# Patient Record
Sex: FEMALE | Race: WHITE | NOT HISPANIC OR LATINO | Employment: OTHER | URBAN - METROPOLITAN AREA
[De-identification: names, ages, dates, MRNs, and addresses within clinical notes are randomized per-mention and may not be internally consistent; named-entity substitution may affect disease eponyms.]

---

## 2023-03-09 LAB
CHOLESTEROL (MG/DL) IN SER/PLAS: 180 MG/DL (ref 0–199)
CHOLESTEROL IN HDL (MG/DL) IN SER/PLAS: 79.7 MG/DL
CHOLESTEROL/HDL RATIO: 2.3
LDL: 84 MG/DL (ref 0–99)
TRIGLYCERIDE (MG/DL) IN SER/PLAS: 83 MG/DL (ref 0–149)
VLDL: 17 MG/DL (ref 0–40)

## 2023-05-04 LAB
ALANINE AMINOTRANSFERASE (SGPT) (U/L) IN SER/PLAS: 13 U/L (ref 7–45)
ALBUMIN (G/DL) IN SER/PLAS: 4.3 G/DL (ref 3.4–5)
ALKALINE PHOSPHATASE (U/L) IN SER/PLAS: 60 U/L (ref 33–136)
ANION GAP IN SER/PLAS: 11 MMOL/L (ref 10–20)
ASPARTATE AMINOTRANSFERASE (SGOT) (U/L) IN SER/PLAS: 16 U/L (ref 9–39)
BASOPHILS (10*3/UL) IN BLOOD BY AUTOMATED COUNT: 0.04 X10E9/L (ref 0–0.1)
BASOPHILS/100 LEUKOCYTES IN BLOOD BY AUTOMATED COUNT: 0.5 % (ref 0–2)
BILIRUBIN TOTAL (MG/DL) IN SER/PLAS: 0.6 MG/DL (ref 0–1.2)
CALCIUM (MG/DL) IN SER/PLAS: 9.8 MG/DL (ref 8.6–10.3)
CARBON DIOXIDE, TOTAL (MMOL/L) IN SER/PLAS: 29 MMOL/L (ref 21–32)
CHLORIDE (MMOL/L) IN SER/PLAS: 102 MMOL/L (ref 98–107)
CHOLESTEROL (MG/DL) IN SER/PLAS: 196 MG/DL (ref 0–199)
CHOLESTEROL IN HDL (MG/DL) IN SER/PLAS: 91.4 MG/DL
CHOLESTEROL/HDL RATIO: 2.1
CREATININE (MG/DL) IN SER/PLAS: 1.04 MG/DL (ref 0.5–1.05)
EOSINOPHILS (10*3/UL) IN BLOOD BY AUTOMATED COUNT: 0.1 X10E9/L (ref 0–0.4)
EOSINOPHILS/100 LEUKOCYTES IN BLOOD BY AUTOMATED COUNT: 1.3 % (ref 0–6)
ERYTHROCYTE DISTRIBUTION WIDTH (RATIO) BY AUTOMATED COUNT: 14.4 % (ref 11.5–14.5)
ERYTHROCYTE MEAN CORPUSCULAR HEMOGLOBIN CONCENTRATION (G/DL) BY AUTOMATED: 31.1 G/DL (ref 32–36)
ERYTHROCYTE MEAN CORPUSCULAR VOLUME (FL) BY AUTOMATED COUNT: 99 FL (ref 80–100)
ERYTHROCYTES (10*6/UL) IN BLOOD BY AUTOMATED COUNT: 3.83 X10E12/L (ref 4–5.2)
GFR FEMALE: 54 ML/MIN/1.73M2
GLUCOSE (MG/DL) IN SER/PLAS: 87 MG/DL (ref 74–99)
HEMATOCRIT (%) IN BLOOD BY AUTOMATED COUNT: 37.9 % (ref 36–46)
HEMOGLOBIN (G/DL) IN BLOOD: 11.8 G/DL (ref 12–16)
IMMATURE GRANULOCYTES/100 LEUKOCYTES IN BLOOD BY AUTOMATED COUNT: 0.4 % (ref 0–0.9)
LDL: 93 MG/DL (ref 0–99)
LEUKOCYTES (10*3/UL) IN BLOOD BY AUTOMATED COUNT: 7.6 X10E9/L (ref 4.4–11.3)
LYMPHOCYTES (10*3/UL) IN BLOOD BY AUTOMATED COUNT: 1.91 X10E9/L (ref 0.8–3)
LYMPHOCYTES/100 LEUKOCYTES IN BLOOD BY AUTOMATED COUNT: 25.3 % (ref 13–44)
MONOCYTES (10*3/UL) IN BLOOD BY AUTOMATED COUNT: 0.73 X10E9/L (ref 0.05–0.8)
MONOCYTES/100 LEUKOCYTES IN BLOOD BY AUTOMATED COUNT: 9.7 % (ref 2–10)
NEUTROPHILS (10*3/UL) IN BLOOD BY AUTOMATED COUNT: 4.75 X10E9/L (ref 1.6–5.5)
NEUTROPHILS/100 LEUKOCYTES IN BLOOD BY AUTOMATED COUNT: 62.8 % (ref 40–80)
NRBC (PER 100 WBCS) BY AUTOMATED COUNT: 0 /100 WBC (ref 0–0)
PLATELETS (10*3/UL) IN BLOOD AUTOMATED COUNT: 309 X10E9/L (ref 150–450)
POTASSIUM (MMOL/L) IN SER/PLAS: 4.2 MMOL/L (ref 3.5–5.3)
PROTEIN TOTAL: 7.2 G/DL (ref 6.4–8.2)
SODIUM (MMOL/L) IN SER/PLAS: 138 MMOL/L (ref 136–145)
THYROTROPIN (MIU/L) IN SER/PLAS BY DETECTION LIMIT <= 0.05 MIU/L: 0.05 MIU/L (ref 0.44–3.98)
THYROXINE (T4) FREE (NG/DL) IN SER/PLAS: 1.04 NG/DL (ref 0.61–1.12)
TRIGLYCERIDE (MG/DL) IN SER/PLAS: 60 MG/DL (ref 0–149)
UREA NITROGEN (MG/DL) IN SER/PLAS: 34 MG/DL (ref 6–23)
VLDL: 12 MG/DL (ref 0–40)

## 2023-09-29 ENCOUNTER — HOSPITAL ENCOUNTER (EMERGENCY)
Dept: DATA CONVERSION | Facility: HOSPITAL | Age: 83
Discharge: HOME | End: 2023-09-30
Payer: MEDICARE

## 2023-09-29 PROCEDURE — 99283 EMERGENCY DEPT VISIT LOW MDM: CPT

## 2023-09-29 PROCEDURE — 9990 CHARGE CONVERSION

## 2023-10-03 PROCEDURE — 9990 CHARGE CONVERSION

## 2023-12-05 DIAGNOSIS — I10 PRIMARY HYPERTENSION: Primary | ICD-10-CM

## 2023-12-05 RX ORDER — HYDROCHLOROTHIAZIDE 12.5 MG/1
12.5 TABLET ORAL DAILY
Qty: 90 TABLET | Refills: 3 | Status: SHIPPED | OUTPATIENT
Start: 2023-12-05

## 2023-12-05 RX ORDER — HYDROCHLOROTHIAZIDE 12.5 MG/1
12.5 TABLET ORAL DAILY
COMMUNITY
Start: 2021-02-01 | End: 2023-12-05 | Stop reason: SDUPTHER

## 2024-01-09 DIAGNOSIS — E78.5 HYPERLIPIDEMIA, UNSPECIFIED HYPERLIPIDEMIA TYPE: ICD-10-CM

## 2024-01-09 RX ORDER — ROSUVASTATIN CALCIUM 40 MG/1
40 TABLET, COATED ORAL DAILY
Qty: 90 TABLET | Refills: 3 | Status: SHIPPED | OUTPATIENT
Start: 2024-01-09 | End: 2024-03-01 | Stop reason: SDUPTHER

## 2024-01-10 ENCOUNTER — ANCILLARY PROCEDURE (OUTPATIENT)
Dept: RADIOLOGY | Facility: CLINIC | Age: 84
End: 2024-01-10
Payer: MEDICARE

## 2024-01-10 DIAGNOSIS — R06.02 SHORTNESS OF BREATH: ICD-10-CM

## 2024-01-10 PROCEDURE — 71046 X-RAY EXAM CHEST 2 VIEWS: CPT

## 2024-01-10 PROCEDURE — 71046 X-RAY EXAM CHEST 2 VIEWS: CPT | Performed by: STUDENT IN AN ORGANIZED HEALTH CARE EDUCATION/TRAINING PROGRAM

## 2024-01-12 PROBLEM — G47.00 INSOMNIA: Status: ACTIVE | Noted: 2024-01-12

## 2024-01-12 PROBLEM — I10 HYPERTENSION: Status: ACTIVE | Noted: 2024-01-12

## 2024-01-12 PROBLEM — K44.9 HIATAL HERNIA: Status: ACTIVE | Noted: 2024-01-12

## 2024-01-12 PROBLEM — R06.09 EXERTIONAL DYSPNEA: Status: ACTIVE | Noted: 2024-01-12

## 2024-01-12 PROBLEM — D36.9 TUBULAR ADENOMA: Status: ACTIVE | Noted: 2024-01-12

## 2024-01-12 PROBLEM — I25.10 CAD (CORONARY ARTERY DISEASE): Status: ACTIVE | Noted: 2024-01-12

## 2024-01-12 PROBLEM — J30.2 SEASONAL ALLERGIES: Status: ACTIVE | Noted: 2024-01-12

## 2024-01-12 PROBLEM — E78.5 HYPERLIPIDEMIA: Status: ACTIVE | Noted: 2024-01-12

## 2024-01-12 PROBLEM — D50.9 IRON DEFICIENCY ANEMIA: Status: ACTIVE | Noted: 2024-01-12

## 2024-01-12 PROBLEM — G62.9 PERIPHERAL NEUROPATHY: Status: ACTIVE | Noted: 2024-01-12

## 2024-01-16 ENCOUNTER — LAB (OUTPATIENT)
Dept: LAB | Facility: LAB | Age: 84
End: 2024-01-16
Payer: MEDICARE

## 2024-01-16 ENCOUNTER — OFFICE VISIT (OUTPATIENT)
Dept: CARDIOLOGY | Facility: CLINIC | Age: 84
End: 2024-01-16
Payer: MEDICARE

## 2024-01-16 VITALS
DIASTOLIC BLOOD PRESSURE: 80 MMHG | SYSTOLIC BLOOD PRESSURE: 136 MMHG | HEART RATE: 79 BPM | WEIGHT: 213 LBS | HEIGHT: 64 IN | OXYGEN SATURATION: 97 % | BODY MASS INDEX: 36.37 KG/M2

## 2024-01-16 DIAGNOSIS — R06.09 EXERTIONAL DYSPNEA: ICD-10-CM

## 2024-01-16 DIAGNOSIS — I15.9 SECONDARY HYPERTENSION: Primary | ICD-10-CM

## 2024-01-16 DIAGNOSIS — I15.9 SECONDARY HYPERTENSION: ICD-10-CM

## 2024-01-16 DIAGNOSIS — I25.10 CORONARY ARTERY DISEASE, UNSPECIFIED VESSEL OR LESION TYPE, UNSPECIFIED WHETHER ANGINA PRESENT, UNSPECIFIED WHETHER NATIVE OR TRANSPLANTED HEART: ICD-10-CM

## 2024-01-16 LAB — BNP SERPL-MCNC: 152 PG/ML (ref 0–99)

## 2024-01-16 PROCEDURE — 3075F SYST BP GE 130 - 139MM HG: CPT | Performed by: INTERNAL MEDICINE

## 2024-01-16 PROCEDURE — 3079F DIAST BP 80-89 MM HG: CPT | Performed by: INTERNAL MEDICINE

## 2024-01-16 PROCEDURE — 1126F AMNT PAIN NOTED NONE PRSNT: CPT | Performed by: INTERNAL MEDICINE

## 2024-01-16 PROCEDURE — 36415 COLL VENOUS BLD VENIPUNCTURE: CPT

## 2024-01-16 PROCEDURE — 1159F MED LIST DOCD IN RCRD: CPT | Performed by: INTERNAL MEDICINE

## 2024-01-16 PROCEDURE — 83880 ASSAY OF NATRIURETIC PEPTIDE: CPT

## 2024-01-16 PROCEDURE — 99214 OFFICE O/P EST MOD 30 MIN: CPT | Performed by: INTERNAL MEDICINE

## 2024-01-16 PROCEDURE — 1036F TOBACCO NON-USER: CPT | Performed by: INTERNAL MEDICINE

## 2024-01-16 RX ORDER — LOSARTAN POTASSIUM 100 MG/1
TABLET ORAL
COMMUNITY
Start: 2015-12-07 | End: 2024-02-29

## 2024-01-16 RX ORDER — OMEPRAZOLE 20 MG/1
CAPSULE, DELAYED RELEASE ORAL
COMMUNITY
Start: 2023-12-13

## 2024-01-16 RX ORDER — MONTELUKAST SODIUM 10 MG/1
TABLET ORAL
COMMUNITY
Start: 2023-10-14

## 2024-01-16 RX ORDER — LEVOTHYROXINE SODIUM 125 UG/1
TABLET ORAL
COMMUNITY
Start: 2017-04-10

## 2024-01-16 RX ORDER — ASPIRIN 81 MG/1
1 TABLET ORAL DAILY
COMMUNITY

## 2024-01-16 RX ORDER — QUETIAPINE FUMARATE 25 MG/1
TABLET, FILM COATED ORAL
COMMUNITY
Start: 2022-01-19

## 2024-01-16 RX ORDER — NITROGLYCERIN 0.4 MG/1
TABLET SUBLINGUAL
COMMUNITY
Start: 2022-04-27

## 2024-01-16 RX ORDER — ESCITALOPRAM OXALATE 10 MG/1
1 TABLET ORAL DAILY
COMMUNITY

## 2024-01-16 RX ORDER — SOLIFENACIN SUCCINATE 5 MG/1
5 TABLET, FILM COATED ORAL DAILY
COMMUNITY
Start: 2023-07-12

## 2024-01-16 RX ORDER — AZITHROMYCIN 250 MG/1
TABLET, FILM COATED ORAL
COMMUNITY
Start: 2023-12-29

## 2024-01-16 RX ORDER — BUDESONIDE AND FORMOTEROL FUMARATE DIHYDRATE 160; 4.5 UG/1; UG/1
AEROSOL RESPIRATORY (INHALATION)
COMMUNITY
Start: 2021-08-17

## 2024-01-16 RX ORDER — ALENDRONATE SODIUM 35 MG/1
TABLET ORAL
COMMUNITY
Start: 2021-02-03

## 2024-01-16 RX ORDER — FERROUS SULFATE 325(65) MG
1 TABLET ORAL DAILY
COMMUNITY
Start: 2017-09-26

## 2024-01-16 RX ORDER — ALBUTEROL SULFATE 90 UG/1
2 AEROSOL, METERED RESPIRATORY (INHALATION) EVERY 4 HOURS PRN
COMMUNITY
Start: 2017-02-24

## 2024-01-16 ASSESSMENT — ENCOUNTER SYMPTOMS: DYSPNEA ON EXERTION: 1

## 2024-01-16 NOTE — PATIENT INSTRUCTIONS
Stop Omega 3 fish oil    For your runny nose try Flonase Nasal Spray (over the counter).  Other options to try if this does not work includes Allegra or Zyrtec.    We need to do one blood test to determine if your shortness of breath is due to your heart or due to your lungs.  Call us one day after the test for the results.

## 2024-01-16 NOTE — PROGRESS NOTES
"Subjective   Lennie Castro is a 83 y.o. female.    Chief Complaint:  Shortness of breath    HPI    Since her last visit her complaints are that of shortness of breath and dyspnea with exertion.  She feels that this is much worse in comparison to her prior evaluation.  She also complains of a constant runny nose.   No typical chest pressure or heaviness.  Also has been seen and followed by the pulmonary service.    Her diagnosis of coronary artery disease is based on a positive calcium score of 1302 consistent with the presence of extensive severe atherosclerotic coronary artery disease. This study was done in March 2022     Her cardiac history is significant for hypertension. There is no history of diabetes. She did smoke in the past from age 21 to age 36. At that time she was smoking about a half a pack of cigarettes per day. She denies a family history of premature coronary artery disease.     Past medical history significant for hiatal hernia. She has a past history of anemia. She has seasonal allergies. She has had left knee surgery. She has had a tonsillectomy and appendectomy. There is a history of hypothyroidism. She has problems with insomnia.     Allergies  NoKnown    · No Known Allergies   Recorded By: Jackeline Casey; 2/1/2021 1:39:13 PM     Family History  Father    · Family history of Heart problem     Social History  Problems    · Alcohol use (V49.89) (Z78.9)   · vodka, working on quiting   · Former smoker (V15.82) (Z87.891)   · smoked for 10-15 years, quit 30 years ago   ·    · No illicit drug use   · Retired    Review of Systems   Constitutional: Positive for malaise/fatigue.   Cardiovascular:  Positive for dyspnea on exertion.   Musculoskeletal:  Positive for arthritis.   All other systems reviewed and are negative.      Visit Vitals  /88 (BP Location: Left arm, Patient Position: Sitting, BP Cuff Size: Adult)   Pulse 79   Ht 1.626 m (5' 4\")   Wt 96.6 kg (213 lb)   SpO2 97%   BMI " 36.56 kg/m²   Smoking Status Former   BSA 2.09 m²        Objective     Constitutional:       Appearance: Not in distress.   Neck:      Vascular: JVD normal.   Pulmonary:      Breath sounds: Normal breath sounds.   Cardiovascular:      Normal rate. Regular rhythm. Normal S1. Normal S2.       Murmurs: There is no murmur.      No gallop.    Pulses:     Intact distal pulses.   Edema:     Peripheral edema absent.   Abdominal:      General: There is no distension.      Palpations: Abdomen is soft.   Neurological:      Mental Status: Alert.         Lab Review:   Lab Results   Component Value Date     05/04/2023    K 4.2 05/04/2023     05/04/2023    CO2 29 05/04/2023    BUN 34 (H) 05/04/2023    CREATININE 1.04 05/04/2023    GLUCOSE 87 05/04/2023    CALCIUM 9.8 05/04/2023     Lab Results   Component Value Date    CHOL 196 05/04/2023    TRIG 60 05/04/2023    HDL 91.4 05/04/2023       Assessment:    1.  Coronary artery disease.  Stents of coronary disease based on CT calcium scoring.  A stress thallium study in April 2022 showed no evidence of ischemia.    2.  Shortness of breath and dyspnea with exertion.  Unclear etiology.  We are going to get a BNP to help us determine whether her symptoms are cardiac or pulmonary.  If her BNP is elevated we will schedule her for an echo study.  If this is abnormal we will proceed with a nuclear stress imaging study.    3.  Hypertension.  Mildly elevated blood pressures.  Encouraged weight loss and salt restriction.    4.  Hyperlipidemia.  On statin therapy.

## 2024-02-29 DIAGNOSIS — I1A.0 RESISTANT HYPERTENSION: ICD-10-CM

## 2024-02-29 RX ORDER — LOSARTAN POTASSIUM 100 MG/1
100 TABLET ORAL DAILY
Qty: 90 TABLET | Refills: 3 | Status: SHIPPED | OUTPATIENT
Start: 2024-02-29

## 2024-03-01 DIAGNOSIS — E78.5 HYPERLIPIDEMIA, UNSPECIFIED HYPERLIPIDEMIA TYPE: Primary | ICD-10-CM

## 2024-03-03 RX ORDER — ROSUVASTATIN CALCIUM 40 MG/1
40 TABLET, COATED ORAL DAILY
Qty: 90 TABLET | Refills: 3 | Status: SHIPPED | OUTPATIENT
Start: 2024-03-03
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
## 2024-06-17 PROBLEM — D72.819 LEUKOPENIA: Status: ACTIVE | Noted: 2024-06-17

## 2024-06-17 PROBLEM — R32 INCONTINENCE: Status: ACTIVE | Noted: 2023-03-10

## 2024-06-17 PROBLEM — S09.90XA CLOSED HEAD INJURY: Status: ACTIVE | Noted: 2024-06-17

## 2024-06-17 PROBLEM — R31.9 HEMATURIA: Status: ACTIVE | Noted: 2023-01-27

## 2024-06-17 PROBLEM — Z86.39 HISTORY OF THYROID DISORDER: Status: ACTIVE | Noted: 2024-06-17

## 2024-06-17 PROBLEM — N32.81 OVERACTIVE BLADDER: Status: ACTIVE | Noted: 2024-06-17

## 2024-06-17 PROBLEM — F43.20 ANTICIPATORY GRIEF: Status: ACTIVE | Noted: 2024-06-17

## 2024-06-17 PROBLEM — Q24.5 CORONARY ARTERY ABNORMALITY (HHS-HCC): Status: ACTIVE | Noted: 2024-01-12

## 2024-06-17 PROBLEM — S00.83XA CONTUSION OF FACE: Status: ACTIVE | Noted: 2024-06-17

## 2024-06-17 PROBLEM — R55 PRE-SYNCOPE: Status: ACTIVE | Noted: 2024-01-12

## 2024-06-17 PROBLEM — H26.9 INCIPIENT CATARACT: Status: ACTIVE | Noted: 2024-06-17

## 2024-06-17 PROBLEM — D64.9 ANEMIA: Status: ACTIVE | Noted: 2024-06-17

## 2024-06-17 PROBLEM — J34.89 NASAL DISCHARGE: Status: ACTIVE | Noted: 2024-06-17

## 2024-06-17 PROBLEM — M85.9 LOW BONE DENSITY: Status: ACTIVE | Noted: 2024-06-17

## 2024-06-17 RX ORDER — TRAZODONE HYDROCHLORIDE 50 MG/1
TABLET ORAL
COMMUNITY
Start: 2017-09-26

## 2024-06-17 RX ORDER — ESCITALOPRAM OXALATE 20 MG/1
TABLET ORAL
COMMUNITY
Start: 2024-01-23

## 2024-06-17 RX ORDER — FLUOXETINE HYDROCHLORIDE 20 MG/1
CAPSULE ORAL
COMMUNITY
Start: 2021-08-04

## 2024-06-17 RX ORDER — QUETIAPINE FUMARATE 50 MG/1
TABLET, FILM COATED ORAL
COMMUNITY
Start: 2024-02-24

## 2024-06-17 RX ORDER — MELOXICAM 15 MG/1
TABLET ORAL
COMMUNITY

## 2024-06-19 ENCOUNTER — OFFICE VISIT (OUTPATIENT)
Dept: OTOLARYNGOLOGY | Facility: CLINIC | Age: 84
End: 2024-06-19
Payer: MEDICARE

## 2024-06-19 VITALS
HEART RATE: 90 BPM | OXYGEN SATURATION: 95 % | TEMPERATURE: 97.9 F | WEIGHT: 215 LBS | HEIGHT: 63 IN | DIASTOLIC BLOOD PRESSURE: 76 MMHG | RESPIRATION RATE: 16 BRPM | BODY MASS INDEX: 38.09 KG/M2 | SYSTOLIC BLOOD PRESSURE: 133 MMHG

## 2024-06-19 DIAGNOSIS — J31.0 CHRONIC RHINITIS: Primary | ICD-10-CM

## 2024-06-19 DIAGNOSIS — J34.2 NASAL SEPTAL DEVIATION: ICD-10-CM

## 2024-06-19 DIAGNOSIS — J34.3 HYPERTROPHY OF BOTH INFERIOR NASAL TURBINATES: ICD-10-CM

## 2024-06-19 DIAGNOSIS — H93.8X9 EAR POPPING, UNSPECIFIED LATERALITY: ICD-10-CM

## 2024-06-19 DIAGNOSIS — J34.89 NASAL DRAINAGE: ICD-10-CM

## 2024-06-19 PROCEDURE — 1159F MED LIST DOCD IN RCRD: CPT | Performed by: STUDENT IN AN ORGANIZED HEALTH CARE EDUCATION/TRAINING PROGRAM

## 2024-06-19 PROCEDURE — 1160F RVW MEDS BY RX/DR IN RCRD: CPT | Performed by: STUDENT IN AN ORGANIZED HEALTH CARE EDUCATION/TRAINING PROGRAM

## 2024-06-19 PROCEDURE — 1126F AMNT PAIN NOTED NONE PRSNT: CPT | Performed by: STUDENT IN AN ORGANIZED HEALTH CARE EDUCATION/TRAINING PROGRAM

## 2024-06-19 PROCEDURE — 1036F TOBACCO NON-USER: CPT | Performed by: STUDENT IN AN ORGANIZED HEALTH CARE EDUCATION/TRAINING PROGRAM

## 2024-06-19 PROCEDURE — 99214 OFFICE O/P EST MOD 30 MIN: CPT | Mod: GC | Performed by: STUDENT IN AN ORGANIZED HEALTH CARE EDUCATION/TRAINING PROGRAM

## 2024-06-19 PROCEDURE — 99204 OFFICE O/P NEW MOD 45 MIN: CPT | Performed by: STUDENT IN AN ORGANIZED HEALTH CARE EDUCATION/TRAINING PROGRAM

## 2024-06-19 RX ORDER — IPRATROPIUM BROMIDE 21 UG/1
2 SPRAY, METERED NASAL 3 TIMES DAILY PRN
Qty: 90 UNSPECIFIED | Refills: 12 | Status: SHIPPED | OUTPATIENT
Start: 2024-06-19 | End: 2025-06-19

## 2024-06-19 SDOH — ECONOMIC STABILITY: FOOD INSECURITY: WITHIN THE PAST 12 MONTHS, THE FOOD YOU BOUGHT JUST DIDN'T LAST AND YOU DIDN'T HAVE MONEY TO GET MORE.: NEVER TRUE

## 2024-06-19 SDOH — ECONOMIC STABILITY: FOOD INSECURITY: WITHIN THE PAST 12 MONTHS, YOU WORRIED THAT YOUR FOOD WOULD RUN OUT BEFORE YOU GOT MONEY TO BUY MORE.: NEVER TRUE

## 2024-06-19 ASSESSMENT — LIFESTYLE VARIABLES
HOW MANY STANDARD DRINKS CONTAINING ALCOHOL DO YOU HAVE ON A TYPICAL DAY: 1 OR 2
HOW OFTEN DO YOU HAVE A DRINK CONTAINING ALCOHOL: 2-4 TIMES A MONTH
AUDIT-C TOTAL SCORE: 2
HOW OFTEN DO YOU HAVE SIX OR MORE DRINKS ON ONE OCCASION: NEVER
SKIP TO QUESTIONS 9-10: 1

## 2024-06-19 ASSESSMENT — PATIENT HEALTH QUESTIONNAIRE - PHQ9
2. FEELING DOWN, DEPRESSED OR HOPELESS: NOT AT ALL
SUM OF ALL RESPONSES TO PHQ9 QUESTIONS 1 AND 2: 0
1. LITTLE INTEREST OR PLEASURE IN DOING THINGS: NOT AT ALL

## 2024-06-19 ASSESSMENT — ENCOUNTER SYMPTOMS
DEPRESSION: 0
OCCASIONAL FEELINGS OF UNSTEADINESS: 1
LOSS OF SENSATION IN FEET: 1

## 2024-06-19 ASSESSMENT — COLUMBIA-SUICIDE SEVERITY RATING SCALE - C-SSRS
1. IN THE PAST MONTH, HAVE YOU WISHED YOU WERE DEAD OR WISHED YOU COULD GO TO SLEEP AND NOT WAKE UP?: NO
6. HAVE YOU EVER DONE ANYTHING, STARTED TO DO ANYTHING, OR PREPARED TO DO ANYTHING TO END YOUR LIFE?: NO
2. HAVE YOU ACTUALLY HAD ANY THOUGHTS OF KILLING YOURSELF?: NO

## 2024-06-19 ASSESSMENT — PAIN SCALES - GENERAL: PAINLEVEL: 0-NO PAIN

## 2024-06-19 NOTE — PROGRESS NOTES
SUBJECTIVE  Patient ID: Lennie Castro is a 83 y.o. female who presents for New Patient Visit (Runny nose and popping ears).    This is an 82y/o female who presented to clinic today with a history of chronic anterior rhinorrhea and intermittent ear popping. She states that she has dealt with the rhinorrhea for many years now. It is clear mucus, constant throughout the day, bilateral, with no associated nasal congestion or post nasal drip. She denies salty/metallic taste, associated headaches, prior nasal or sinus surgeries. No prior head trauma. An allergy test a few years ago was negative. She has not had any improvement in her symptoms with trials of Allegra, Claritin, Zyrtec, Sudafed and Flonase. She has not tried any other nasal sprays.     Secondly, she reports a history of intermittent ear popping that has been equal bilaterally. She denies any subjective hearing loss, tinnitus, vertigo, otorrhea, recurrent ear infections or ear surgeries. She had an hearing testing 5 years ago which came back normal.     Review of Systems  Complete ROS negative except as noted above or on patient intake form and as above.    OBJECTIVE  Physical Exam  CONSTITUTIONAL: Well appearing female who appears stated age.  PSYCHIATRIC: Alert, appropriate mood and affect.  RESPIRATORY: Normal inspiration and expiration and chest wall expansion; no use of accessory muscles to breathe.  VOICE: Clear speech without hoarseness. No stridor nor stertor.  HEAD, FACE, AND SKIN: Symmetric facial feature. No cutaneous masses or lesions were visualized. The parotid and submandibular glands were normal to palpation.  EARS: External ears were normally formed with no lesions. The external auditory canals were clear. The tympanic membranes were intact and in the neutral position. No significant retraction pockets nor effusions were appreciated.  NOSE: Nasal dorsum was midline. Anterior rhinoscopy demonstrated a septal deviation to the left pushing into  the inferior turbinate. Right inferior turbinates hypertrophied--likely compensatory. Nasal cavities otherwise patent with no obvious nasal masses, polyps, mucopurulence, nor other lesions were appreciated.  ORAL CAVITY: Lips were without lesions. Moist mucous membranes. No lesions appreciated along the gingiva, oral mucosa, nor tongue.  DENTITION: Grossly normal without obvious infection nor inflammation.  OROPHARYNX: No lesion nor mucosal abnormality. The uvula was normal appearing. The tonsils were surgically absent.  NECK: Visualization and palpation of the neck revealed no mass lesions, no thyromegaly or thyroid masses. No cutaneous lesions appreciated.  LYMPHATICS (CERVICAL): There were no palpable lymph nodes in the posterior triangle, submandibular triangle, jugulodigastric region, nor central neck.     ASSESSMENT/PLAN  Diagnoses and all orders for this visit:  Chronic rhinitis  -     ipratropium (Atrovent) 21 mcg (0.03 %) nasal spray; Administer 2 sprays into each nostril 3 times a day as needed for rhinitis (nasal drainage).  Nasal drainage  -     ipratropium (Atrovent) 21 mcg (0.03 %) nasal spray; Administer 2 sprays into each nostril 3 times a day as needed for rhinitis (nasal drainage).  Ear popping, unspecified laterality  Nasal septal deviation  Hypertrophy of both inferior nasal turbinates      83 y.o. female with multiple ENT concerns.    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  Her evaluation today is suggestive of vasomotor rhinitis given her history of negative allergy testing and the lack of response to prior therapies (INCS, oral antihistamines). She is willing to do a trial of Ipratropium bromide and will return to clinic in the fall to re-assess.  Depending on her success with medical therapy we could consider changes to medication (trial of antihistamine nasal spray), allergy referral, nasal endoscopy, and/or  imaging.    Ear Popping  Her exam is unremarkable today. She will plan to get an Audiogram, which we can review at her next clinic visit.     This note was created using speech recognition transcription software. Despite proofreading, typographical errors may be present that affect the meaning of the content. Please contact my office with any questions.

## 2024-07-09 ENCOUNTER — HOSPITAL ENCOUNTER (OUTPATIENT)
Dept: CARDIOLOGY | Facility: CLINIC | Age: 84
Discharge: HOME | End: 2024-07-09
Payer: MEDICARE

## 2024-07-09 ENCOUNTER — APPOINTMENT (OUTPATIENT)
Dept: CARDIOLOGY | Facility: CLINIC | Age: 84
End: 2024-07-09
Payer: MEDICARE

## 2024-07-09 VITALS
SYSTOLIC BLOOD PRESSURE: 133 MMHG | DIASTOLIC BLOOD PRESSURE: 76 MMHG | HEIGHT: 63 IN | BODY MASS INDEX: 38.09 KG/M2 | WEIGHT: 215 LBS

## 2024-07-09 VITALS
HEART RATE: 91 BPM | HEIGHT: 63 IN | BODY MASS INDEX: 37.03 KG/M2 | DIASTOLIC BLOOD PRESSURE: 80 MMHG | SYSTOLIC BLOOD PRESSURE: 130 MMHG | WEIGHT: 209 LBS | OXYGEN SATURATION: 94 %

## 2024-07-09 DIAGNOSIS — I10 PRIMARY HYPERTENSION: ICD-10-CM

## 2024-07-09 DIAGNOSIS — R06.00 DYSPNEA, UNSPECIFIED: ICD-10-CM

## 2024-07-09 DIAGNOSIS — I34.0 MODERATE MITRAL REGURGITATION: ICD-10-CM

## 2024-07-09 DIAGNOSIS — I1A.0 RESISTANT HYPERTENSION: ICD-10-CM

## 2024-07-09 DIAGNOSIS — I25.119 CORONARY ARTERY DISEASE INVOLVING NATIVE CORONARY ARTERY OF NATIVE HEART WITH ANGINA PECTORIS (CMS-HCC): ICD-10-CM

## 2024-07-09 DIAGNOSIS — E78.5 HYPERLIPIDEMIA, UNSPECIFIED HYPERLIPIDEMIA TYPE: ICD-10-CM

## 2024-07-09 DIAGNOSIS — R06.09 EXERTIONAL DYSPNEA: ICD-10-CM

## 2024-07-09 DIAGNOSIS — I15.9 SECONDARY HYPERTENSION: Primary | ICD-10-CM

## 2024-07-09 DIAGNOSIS — R06.09 DYSPNEA ON EXERTION: ICD-10-CM

## 2024-07-09 DIAGNOSIS — I15.9 SECONDARY HYPERTENSION: ICD-10-CM

## 2024-07-09 DIAGNOSIS — I25.10 CORONARY ARTERY DISEASE, UNSPECIFIED VESSEL OR LESION TYPE, UNSPECIFIED WHETHER ANGINA PRESENT, UNSPECIFIED WHETHER NATIVE OR TRANSPLANTED HEART: ICD-10-CM

## 2024-07-09 PROBLEM — Q24.5 CORONARY ARTERY ABNORMALITY (HHS-HCC): Status: RESOLVED | Noted: 2024-01-12 | Resolved: 2024-07-09

## 2024-07-09 PROCEDURE — 93000 ELECTROCARDIOGRAM COMPLETE: CPT | Performed by: INTERNAL MEDICINE

## 2024-07-09 PROCEDURE — 1036F TOBACCO NON-USER: CPT | Performed by: INTERNAL MEDICINE

## 2024-07-09 PROCEDURE — 93306 TTE W/DOPPLER COMPLETE: CPT

## 2024-07-09 PROCEDURE — 99214 OFFICE O/P EST MOD 30 MIN: CPT | Performed by: INTERNAL MEDICINE

## 2024-07-09 PROCEDURE — 3079F DIAST BP 80-89 MM HG: CPT | Performed by: INTERNAL MEDICINE

## 2024-07-09 PROCEDURE — 1159F MED LIST DOCD IN RCRD: CPT | Performed by: INTERNAL MEDICINE

## 2024-07-09 PROCEDURE — 3075F SYST BP GE 130 - 139MM HG: CPT | Performed by: INTERNAL MEDICINE

## 2024-07-09 PROCEDURE — 93306 TTE W/DOPPLER COMPLETE: CPT | Performed by: INTERNAL MEDICINE

## 2024-07-09 RX ORDER — FLUTICASONE FUROATE AND VILANTEROL TRIFENATATE 100; 25 UG/1; UG/1
POWDER RESPIRATORY (INHALATION)
COMMUNITY
Start: 2024-04-25

## 2024-07-09 RX ORDER — PREDNISOLONE ACETATE 10 MG/ML
SUSPENSION/ DROPS OPHTHALMIC
COMMUNITY
Start: 2024-06-20

## 2024-07-09 RX ORDER — CLONAZEPAM 0.5 MG/1
TABLET ORAL
COMMUNITY
Start: 2024-06-22

## 2024-07-09 NOTE — PROGRESS NOTES
Subjective   Lennie Castro is a 84 y.o. female.    Chief Complaint:  Shortness of breath and dyspnea with exertion.  Fatigue.    HPI    Over the several months she has noted increased shortness of breath and exertional dyspnea.  Has not had typical anginal symptoms.  She feels however her shortness of breath and dyspnea is much worse.  Has continued to struggle with her weight and has had difficulty losing weight.  Also has some arthritis issues.    Her diagnosis of coronary artery disease is based on a positive calcium score of 1302 consistent with the presence of extensive severe atherosclerotic coronary artery disease. This study was done in March 2022     Her cardiac history is significant for hypertension. There is no history of diabetes. She did smoke in the past from age 21 to age 36. At that time she was smoking about a half a pack of cigarettes per day. She denies a family history of premature coronary artery disease.     Past medical history significant for hiatal hernia. She has a past history of anemia. She has seasonal allergies. She has had left knee surgery. She has had a tonsillectomy and appendectomy. There is a history of hypothyroidism. She has problems with insomnia.      Allergies  NoKnown    · No Known Allergies   Recorded By: Jackeline Casey; 2/1/2021 1:39:13 PM     Family History  Father    · Family history of Heart problem     Social History  Problems    · Alcohol use (V49.89) (Z78.9)   · vodka, working on quiting   · Former smoker (V15.82) (Z87.891)   · smoked for 10-15 years, quit 30 years ago   ·    · No illicit drug use   · Retired     Review of Systems   Constitutional: Positive for malaise/fatigue.   Cardiovascular:  Positive for dyspnea on exertion.   Musculoskeletal:  Positive for arthritis.   All other systems reviewed and are negative.      Current Outpatient Medications   Medication Sig Dispense Refill    albuterol (ProAir HFA) 90 mcg/actuation inhaler Inhale 2 puffs  "every 4 hours if needed.      alendronate (Fosamax) 35 mg tablet Take by mouth.      aspirin 81 mg EC tablet Take 1 tablet (81 mg) by mouth once daily.      Breo Ellipta 100-25 mcg/dose inhaler       clonazePAM (KlonoPIN) 0.5 mg tablet       escitalopram (Lexapro) 20 mg tablet       ferrous sulfate, 325 mg ferrous sulfate, tablet Take 1 tablet by mouth once daily.      hydroCHLOROthiazide (HYDRODiuril) 12.5 mg tablet Take 1 tablet (12.5 mg) by mouth once daily. 90 tablet 3    ipratropium (Atrovent) 21 mcg (0.03 %) nasal spray Administer 2 sprays into each nostril 3 times a day as needed for rhinitis (nasal drainage). 90 Unspecified 12    levothyroxine (Synthroid, Levoxyl) 125 mcg tablet       losartan (Cozaar) 100 mg tablet TAKE 1 TABLET EVERY DAY 90 tablet 3    meloxicam (Mobic) 15 mg tablet Take by mouth.      montelukast (Singulair) 10 mg tablet       nitroglycerin (Nitrostat) 0.4 mg SL tablet Place under the tongue every 5 minutes if needed.      omeprazole (PriLOSEC) 20 mg DR capsule       prednisoLONE acetate (Pred-Forte) 1 % ophthalmic suspension SHAKE LIQUID AND INSTILL 1 DROP IN RIGHT EYE THREE TIMES DAILY      QUEtiapine (SEROquel) 50 mg tablet       rosuvastatin (Crestor) 40 mg tablet Take 1 tablet (40 mg) by mouth once daily. 90 tablet 3    Symbicort 160-4.5 mcg/actuation inhaler Inhale.      FLUoxetine (PROzac) 20 mg capsule Take by mouth.      solifenacin (VESIcare) 5 mg tablet Take 1 tablet (5 mg) by mouth once daily.      traZODone (Desyrel) 50 mg tablet Take by mouth.       No current facility-administered medications for this visit.        Visit Vitals  /80 (BP Location: Right arm)   Pulse 91   Ht 1.6 m (5' 3\")   Wt 94.8 kg (209 lb)   SpO2 94%   BMI 37.02 kg/m²   Smoking Status Former   BSA 2.05 m²        Objective     Constitutional:       Appearance: Not in distress.   Neck:      Vascular: JVD normal.   Pulmonary:      Breath sounds: Normal breath sounds.   Cardiovascular:      Normal rate. " Regular rhythm. Normal S1. Normal S2.       Murmurs: There is a grade 1/6 systolic murmur.      No gallop.    Pulses:     Intact distal pulses.   Edema:     Peripheral edema present.     Pretibial: bilateral 1+ edema of the pretibial area.  Abdominal:      General: There is no distension.      Palpations: Abdomen is soft.   Neurological:      Mental Status: Alert.         Lab Review:   Lab Results   Component Value Date     05/04/2023    K 4.2 05/04/2023     05/04/2023    CO2 29 05/04/2023    BUN 34 (H) 05/04/2023    CREATININE 1.04 05/04/2023    GLUCOSE 87 05/04/2023    CALCIUM 9.8 05/04/2023     Lab Results   Component Value Date    CHOL 196 05/04/2023    TRIG 60 05/04/2023    HDL 91.4 05/04/2023       Assessment:    1.  Coronary artery disease.  Extensive coronary artery disease based on her CT calcium score which was done in 2022.  A stress thallium study at that time showed no evidence of ischemia.  Not having anginal symptoms although activity levels are limited.    2.  Hypertension.  Blood pressures are adequately controlled.    3.  Hyperlipidemia.  Followed by primary care.  Latest LDL done a year ago was about 93.    4.  Shortness of breath and exertional dyspnea.  We did do a BNP which only mildly elevated at 152.  Today's echocardiographic study shows normal left ventricular function.    5.  Moderate mitral regurgitation.  Noted on today's echo study.

## 2024-07-09 NOTE — PATIENT INSTRUCTIONS
Your echocardiogram shows normal heart function.    The mitral valve has a mild to moderate leak which is unlikely to be a problem in the future.

## 2024-07-10 LAB
AORTIC VALVE MEAN GRADIENT: 3.1 MMHG
AORTIC VALVE PEAK VELOCITY: 1.13 M/S
AV PEAK GRADIENT: 5.1 MMHG
AVA (PEAK VEL): 2.59 CM2
AVA (VTI): 2.58 CM2
EJECTION FRACTION APICAL 4 CHAMBER: 62.2
EJECTION FRACTION: 58 %
LEFT ATRIUM VOLUME AREA LENGTH INDEX BSA: 21.8 ML/M2
LEFT VENTRICLE INTERNAL DIMENSION DIASTOLE: 3.9 CM (ref 3.5–6)
LEFT VENTRICULAR OUTFLOW TRACT DIAMETER: 1.97 CM
MITRAL VALVE E/A RATIO: 0.83
RIGHT VENTRICLE FREE WALL PEAK S': 1 CM/S
RIGHT VENTRICLE PEAK SYSTOLIC PRESSURE: 25.6 MMHG
TRICUSPID ANNULAR PLANE SYSTOLIC EXCURSION: 2.6 CM

## 2024-07-10 RX ORDER — LOSARTAN POTASSIUM 100 MG/1
100 TABLET ORAL DAILY
Qty: 90 TABLET | Refills: 3 | Status: SHIPPED | OUTPATIENT
Start: 2024-07-10

## 2024-07-10 RX ORDER — HYDROCHLOROTHIAZIDE 12.5 MG/1
12.5 TABLET ORAL DAILY
Qty: 90 TABLET | Refills: 3 | Status: SHIPPED | OUTPATIENT
Start: 2024-07-10

## 2024-07-10 RX ORDER — ROSUVASTATIN CALCIUM 40 MG/1
40 TABLET, COATED ORAL DAILY
Qty: 90 TABLET | Refills: 3 | Status: SHIPPED | OUTPATIENT
Start: 2024-07-10

## 2024-09-25 ENCOUNTER — CLINICAL SUPPORT (OUTPATIENT)
Dept: AUDIOLOGY | Facility: CLINIC | Age: 84
End: 2024-09-25
Payer: MEDICARE

## 2024-09-25 ENCOUNTER — OFFICE VISIT (OUTPATIENT)
Dept: OTOLARYNGOLOGY | Facility: CLINIC | Age: 84
End: 2024-09-25
Payer: MEDICARE

## 2024-09-25 VITALS
SYSTOLIC BLOOD PRESSURE: 124 MMHG | HEART RATE: 72 BPM | DIASTOLIC BLOOD PRESSURE: 74 MMHG | WEIGHT: 219 LBS | HEIGHT: 63 IN | BODY MASS INDEX: 38.8 KG/M2 | TEMPERATURE: 98 F

## 2024-09-25 DIAGNOSIS — J34.89 NASAL DRAINAGE: ICD-10-CM

## 2024-09-25 DIAGNOSIS — H90.3 SENSORINEURAL HEARING LOSS (SNHL), BILATERAL: Primary | ICD-10-CM

## 2024-09-25 DIAGNOSIS — J34.2 NASAL SEPTAL DEVIATION: ICD-10-CM

## 2024-09-25 DIAGNOSIS — J34.3 HYPERTROPHY OF BOTH INFERIOR NASAL TURBINATES: ICD-10-CM

## 2024-09-25 DIAGNOSIS — J31.0 CHRONIC RHINITIS: ICD-10-CM

## 2024-09-25 DIAGNOSIS — H90.3 BILATERAL SENSORINEURAL HEARING LOSS: Primary | ICD-10-CM

## 2024-09-25 PROCEDURE — 99214 OFFICE O/P EST MOD 30 MIN: CPT | Performed by: STUDENT IN AN ORGANIZED HEALTH CARE EDUCATION/TRAINING PROGRAM

## 2024-09-25 PROCEDURE — 3074F SYST BP LT 130 MM HG: CPT | Performed by: STUDENT IN AN ORGANIZED HEALTH CARE EDUCATION/TRAINING PROGRAM

## 2024-09-25 PROCEDURE — 31231 NASAL ENDOSCOPY DX: CPT | Performed by: STUDENT IN AN ORGANIZED HEALTH CARE EDUCATION/TRAINING PROGRAM

## 2024-09-25 PROCEDURE — 1159F MED LIST DOCD IN RCRD: CPT | Performed by: STUDENT IN AN ORGANIZED HEALTH CARE EDUCATION/TRAINING PROGRAM

## 2024-09-25 PROCEDURE — 1160F RVW MEDS BY RX/DR IN RCRD: CPT | Performed by: STUDENT IN AN ORGANIZED HEALTH CARE EDUCATION/TRAINING PROGRAM

## 2024-09-25 PROCEDURE — 92550 TYMPANOMETRY & REFLEX THRESH: CPT | Performed by: AUDIOLOGIST

## 2024-09-25 PROCEDURE — 3078F DIAST BP <80 MM HG: CPT | Performed by: STUDENT IN AN ORGANIZED HEALTH CARE EDUCATION/TRAINING PROGRAM

## 2024-09-25 PROCEDURE — 92557 COMPREHENSIVE HEARING TEST: CPT | Performed by: AUDIOLOGIST

## 2024-09-25 PROCEDURE — 1126F AMNT PAIN NOTED NONE PRSNT: CPT | Performed by: STUDENT IN AN ORGANIZED HEALTH CARE EDUCATION/TRAINING PROGRAM

## 2024-09-25 PROCEDURE — 99214 OFFICE O/P EST MOD 30 MIN: CPT | Mod: 25 | Performed by: STUDENT IN AN ORGANIZED HEALTH CARE EDUCATION/TRAINING PROGRAM

## 2024-09-25 PROCEDURE — 1036F TOBACCO NON-USER: CPT | Performed by: STUDENT IN AN ORGANIZED HEALTH CARE EDUCATION/TRAINING PROGRAM

## 2024-09-25 RX ORDER — IPRATROPIUM BROMIDE 42 UG/1
2 SPRAY, METERED NASAL 3 TIMES DAILY PRN
Qty: 15 ML | Refills: 3 | Status: SHIPPED | OUTPATIENT
Start: 2024-09-25

## 2024-09-25 RX ORDER — LEVOTHYROXINE SODIUM 100 UG/1
TABLET ORAL
COMMUNITY
Start: 2024-06-26

## 2024-09-25 ASSESSMENT — ENCOUNTER SYMPTOMS
OCCASIONAL FEELINGS OF UNSTEADINESS: 0
DEPRESSION: 0
LOSS OF SENSATION IN FEET: 0

## 2024-09-25 ASSESSMENT — PAIN SCALES - GENERAL: PAINLEVEL: 0-NO PAIN

## 2024-09-25 NOTE — PROGRESS NOTES
"AUDIOLOGY ADULT AUDIOMETRIC EVALUATION      Name:  Lennie Castro  :  1940  Age:  84 y.o.  Date of Evaluation:  2024    HISTORY  Reason for visit:  ear concerns  Ms. Castro is seen 2024 at the request of Alex Garza M.D. for an evaluation of hearing.      Chief complaint:    - Constant rhinorrhea  - patient reports no ear popping at this time      Hearing loss:  denies hearing loss; reports left hearing is worse than right   Tinnitus:    denies   Otitis Media:  denies   Otologic surgical history:   denies   Dizziness/imbalance:   denies   Otalgia:  denies   Ear pressure/fullness:  denies  History of excessive noise exposure:  yes  Other: denies     Hearing aid history: none         EVALUATION  Please find audiogram in \"Media\" tab (Document Type:  Audiology Report) or included at the bottom of this note.    RESULTS   Otoscopic Evaluation: clear canals bilaterally      Immittance Measures (226 Hz probe tone):   Tympanometry is consistent with normal middle ear pressure and normal tympanic membrane mobility bilaterally.       Ipsilateral acoustic reflexes (500-4000 Hz) are present for the right ear for 500-2000 Hz (absent 4000 Hz) and absent for the left ear for 500-4000 Hz.        Test technique:  standard behavioral technique via insert earphones.  Reliability is good.    Pure Tone Audiometry:  Hearing sensitivity is in the normal hearing to moderately-severe hearing loss range bilaterally.       Speech Audiometry:        Right Ear:  Speech Reception Threshold (SRT) was obtained at 25 dBHL                 Speech discrimination score was 100% in quiet when words were presented at 75 dBHL (10 item NU-6 ordered-by-difficulty list).        Left Ear:  Speech Reception Threshold (SRT) was obtained at 20 dBHL                 Speech discrimination score was 100% in quiet when words were presented at 70 dBHL (10 item NU-6 ordered-by-difficulty list).      IMPRESSIONS:  Patient is expected to have " communication difficulty in adverse listening environments.    Patient is expected to benefit from effective communication strategies and may benefit from devices that provide amplification and/or improve the desired sound signal over that of background noise.       RECOMMENDATIONS  Continue with ENT follow-up with Alex Garza M.D.   Reassess hearing in 1 year (or sooner if medically indicated or if there is a concern for a change in hearing).    Continue with medical follow-up as indicated.       PATIENT EDUCATION  Discussed results and recommendations with patient.  Questions were addressed and the patient was encouraged to contact our department should concerns arise.       JENNIE Mcintyre, [facility]  Licensed Audiologist

## 2024-09-25 NOTE — PROGRESS NOTES
SUBJECTIVE  Patient ID: Lennie Castro is a 84 y.o. female who presents for Hearing Loss.    History 6/19/2024:  This is an 82y/o female who presented to clinic today with a history of chronic anterior rhinorrhea and intermittent ear popping. She states that she has dealt with the rhinorrhea for many years now. It is clear mucus, constant throughout the day, bilateral, with no associated nasal congestion or post nasal drip. She denies salty/metallic taste, associated headaches, prior nasal or sinus surgeries. No prior head trauma. An allergy test a few years ago was negative. She has not had any improvement in her symptoms with trials of Allegra, Claritin, Zyrtec, Sudafed and Flonase. She has not tried any other nasal sprays.     Secondly, she reports a history of intermittent ear popping that has been equal bilaterally. She denies any subjective hearing loss, tinnitus, vertigo, otorrhea, recurrent ear infections or ear surgeries. She had an hearing testing 5 years ago which came back normal.     Update 9/25/2024:  Follow-up today for several concerns.  Here today with audiogram.  She reports that she has not found much improvement with ipratropium bromide BID. Still having constant nasal drainage.  She reports that her ears have not been popping.    OBJECTIVE  Physical Exam  CONSTITUTIONAL: Well appearing female who appears stated age.  PSYCHIATRIC: Alert, appropriate mood and affect.  RESPIRATORY: Normal inspiration and expiration and chest wall expansion; no use of accessory muscles to breathe.  VOICE: Clear speech without hoarseness. No stridor nor stertor.  HEAD, FACE, AND SKIN: Symmetric facial feature.  EARS: External ears were normally formed with no lesions. The external auditory canals were clear. The tympanic membranes were intact and in the neutral position. No significant retraction pockets nor effusions were appreciated.  NOSE: Nasal dorsum was midline. Anterior rhinoscopy demonstrated a septal  deviation to the right superiorly vs swell body edema. There is left NSD inferiorly.  There is mild ITH bilaterally. Nasal cavities otherwise patent with no obvious nasal masses, polyps, mucopurulence, nor other lesions were appreciated.  DENTITION: Grossly normal without obvious infection nor inflammation.  OROPHARYNX: No lesion nor mucosal abnormality. The uvula was normal appearing. The tonsils were surgically absent. No drainage.    --------------------------------------------------  Audiology:  9/25/2024.  I personally reviewed the patient's audiogram from today.  This demonstrated normal a symmetric mild sloping to normal sloping to severe sensorineural hearing loss bilaterally.  She had excellent word recognition scores, type A tympanograms.  --------------------------------------------------    --------------------------------------------------  Procedure: Nasal endoscopy - Diagnostic  Indication: Chronic rhinitis, nasal drainage  Informed consent verbally obtained: The risks, benefits, alternatives, and expectations were discussed with the patient, who wished to proceed  Anesthesia: Topical lidocaine/oxymetazoline    Findings: After topical anesthetic was applied the nasal cavities were examined with a flexible endoscope. The septum was S-shaped deviated right to left.  - Right: The inferior turbinate was severely hypertrophied. The middle turbinate quite edematous; the middle meatus was poorly visualized secondary to edema and clear drainage.  The spheno-ethmoid recess was free of purulence, masses, lesions, or polyps. The nasal passageway was quite narrow secondary to edema.  - Left: The inferior turbinate was moderately hypertrophied. The middle turbinate appeared healthy; the middle meatus had clear drainage.  The spheno-ethmoid recess was free of purulence, masses, lesions, or polyps. The nasal passageway is narrowed by edema and septal deviation.    The nasopharynx was notable for significant clear  drainage.  No pharyngeal/laryngeal lesions or masses.  There is bilateral symmetric true vocal fold motion.    There were no complications and the patient tolerated the procedure well.  --------------------------------------------------    ASSESSMENT/PLAN  Diagnoses and all orders for this visit:  Chronic rhinitis  -     ipratropium (Atrovent) 42 mcg (0.06 %) nasal spray; Administer 2 sprays into each nostril 3 times a day as needed for rhinitis.  Bilateral sensorineural hearing loss  Nasal septal deviation  Nasal drainage  Hypertrophy of both inferior nasal turbinates        84 y.o. female with multiple ENT concerns.    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  Her evaluation is suggestive of vasomotor rhinitis given her history of negative allergy testing and the lack of response to prior therapies (INCS, oral antihistamines).  Unfortunately, she did not note significant difference with twice daily ipratropium bromide.  On initial nasal endoscopy there is quite prominent edema and clear drainage throughout the nasal cavities.  I recommended that we increase her dosing of ipratropium to 0.06% and 3 times daily.  Depending on her success she may need to reconsider trialing a combination of a nasal steroid spray with antihistamine spray.  We could also consider repeat allergy testing versus imaging.    Ear Popping, bilateral SNHL  The patient initially presented with ear popping, although this seems resolved.  However, audiogram does demonstrate a high-frequency sensorineural hearing loss.  She does not notice any difficulty in communication.  She is therefore not interested in hearing aids.  We discussed how there is no true treatment for hearing loss.  I advised the patient to protect their remaining hearing.  The patient should get a follow-up audiogram yearly.    This note was created using speech recognition transcription software. Despite  proofreading, typographical errors may be present that affect the meaning of the content. Please contact my office with any questions.

## 2024-12-18 ENCOUNTER — OFFICE VISIT (OUTPATIENT)
Dept: OTOLARYNGOLOGY | Facility: CLINIC | Age: 84
End: 2024-12-18
Payer: MEDICARE

## 2024-12-18 VITALS
HEIGHT: 63 IN | DIASTOLIC BLOOD PRESSURE: 86 MMHG | BODY MASS INDEX: 38.79 KG/M2 | HEART RATE: 68 BPM | OXYGEN SATURATION: 96 % | SYSTOLIC BLOOD PRESSURE: 144 MMHG

## 2024-12-18 DIAGNOSIS — J31.0 CHRONIC RHINITIS: Primary | ICD-10-CM

## 2024-12-18 DIAGNOSIS — J34.89 NASAL DRAINAGE: ICD-10-CM

## 2024-12-18 DIAGNOSIS — J34.3 HYPERTROPHY OF BOTH INFERIOR NASAL TURBINATES: ICD-10-CM

## 2024-12-18 PROCEDURE — 99213 OFFICE O/P EST LOW 20 MIN: CPT | Performed by: STUDENT IN AN ORGANIZED HEALTH CARE EDUCATION/TRAINING PROGRAM

## 2024-12-18 PROCEDURE — 3079F DIAST BP 80-89 MM HG: CPT | Performed by: STUDENT IN AN ORGANIZED HEALTH CARE EDUCATION/TRAINING PROGRAM

## 2024-12-18 PROCEDURE — 3077F SYST BP >= 140 MM HG: CPT | Performed by: STUDENT IN AN ORGANIZED HEALTH CARE EDUCATION/TRAINING PROGRAM

## 2024-12-18 PROCEDURE — 1036F TOBACCO NON-USER: CPT | Performed by: STUDENT IN AN ORGANIZED HEALTH CARE EDUCATION/TRAINING PROGRAM

## 2024-12-18 PROCEDURE — 1126F AMNT PAIN NOTED NONE PRSNT: CPT | Performed by: STUDENT IN AN ORGANIZED HEALTH CARE EDUCATION/TRAINING PROGRAM

## 2024-12-18 PROCEDURE — G2211 COMPLEX E/M VISIT ADD ON: HCPCS | Performed by: STUDENT IN AN ORGANIZED HEALTH CARE EDUCATION/TRAINING PROGRAM

## 2024-12-18 PROCEDURE — 1159F MED LIST DOCD IN RCRD: CPT | Performed by: STUDENT IN AN ORGANIZED HEALTH CARE EDUCATION/TRAINING PROGRAM

## 2024-12-18 PROCEDURE — 1160F RVW MEDS BY RX/DR IN RCRD: CPT | Performed by: STUDENT IN AN ORGANIZED HEALTH CARE EDUCATION/TRAINING PROGRAM

## 2024-12-18 RX ORDER — AZELASTINE 1 MG/ML
2 SPRAY, METERED NASAL DAILY
Qty: 30 ML | Refills: 11 | Status: SHIPPED | OUTPATIENT
Start: 2024-12-18 | End: 2025-12-18

## 2024-12-18 RX ORDER — FLUTICASONE PROPIONATE 50 MCG
2 SPRAY, SUSPENSION (ML) NASAL DAILY
Qty: 16 G | Refills: 11 | Status: SHIPPED | OUTPATIENT
Start: 2024-12-18 | End: 2025-12-18

## 2024-12-18 ASSESSMENT — ENCOUNTER SYMPTOMS
LOSS OF SENSATION IN FEET: 0
DEPRESSION: 0
OCCASIONAL FEELINGS OF UNSTEADINESS: 0

## 2024-12-18 ASSESSMENT — COLUMBIA-SUICIDE SEVERITY RATING SCALE - C-SSRS
6. HAVE YOU EVER DONE ANYTHING, STARTED TO DO ANYTHING, OR PREPARED TO DO ANYTHING TO END YOUR LIFE?: NO
2. HAVE YOU ACTUALLY HAD ANY THOUGHTS OF KILLING YOURSELF?: NO
1. IN THE PAST MONTH, HAVE YOU WISHED YOU WERE DEAD OR WISHED YOU COULD GO TO SLEEP AND NOT WAKE UP?: NO

## 2024-12-18 ASSESSMENT — PAIN SCALES - GENERAL: PAINLEVEL_OUTOF10: 0-NO PAIN

## 2024-12-18 NOTE — PROGRESS NOTES
SUBJECTIVE  Patient ID: Lennie Castro is a 84 y.o. female who presents for Follow-up.    History 6/19/2024:  This is an 84y/o female who presented to clinic today with a history of chronic anterior rhinorrhea and intermittent ear popping. She states that she has dealt with the rhinorrhea for many years now. It is clear mucus, constant throughout the day, bilateral, with no associated nasal congestion or post nasal drip. She denies salty/metallic taste, associated headaches, prior nasal or sinus surgeries. No prior head trauma. An allergy test a few years ago was negative. She has not had any improvement in her symptoms with trials of Allegra, Claritin, Zyrtec, Sudafed and Flonase. She has not tried any other nasal sprays.     Secondly, she reports a history of intermittent ear popping that has been equal bilaterally. She denies any subjective hearing loss, tinnitus, vertigo, otorrhea, recurrent ear infections or ear surgeries. She had an hearing testing 5 years ago which came back normal.     Update 9/25/2024:  Follow-up today for several concerns.  Here today with audiogram.  She reports that she has not found much improvement with ipratropium bromide BID. Still having constant nasal drainage.  She reports that her ears have not been popping.    Update 12/18/2024:  Since last visit her nose continues to have constant dripping despite increased dosing of ipratropium. If anything she feels that her throat phlegm is thicker/worse.    OBJECTIVE  Physical Exam  CONSTITUTIONAL: Well appearing female who appears stated age.  PSYCHIATRIC: Alert, appropriate mood and affect.  RESPIRATORY: Normal inspiration and expiration and chest wall expansion; no use of accessory muscles to breathe.  VOICE: Clear speech without hoarseness. No stridor nor stertor.  HEAD, FACE, AND SKIN: Symmetric facial feature.  NOSE: Nasal dorsum was midline. Anterior rhinoscopy demonstrated a septal deviation to the right superiorly and eft NSD  inferiorly.  There is moderate ITH bilaterally. Nasal cavities otherwise patent with no obvious nasal masses, polyps, mucopurulence, nor other lesions were appreciated.  DENTITION: Grossly normal without obvious infection nor inflammation.  OROPHARYNX: No lesion nor mucosal abnormality. The uvula was normal appearing. The tonsils were surgically absent. No drainage.    --------------------------------------------------  Audiology:  9/25/2024. This demonstrated normal a symmetric mild sloping to normal sloping to severe sensorineural hearing loss bilaterally.  She had excellent word recognition scores, type A tympanograms.    --------------------------------------------------    ASSESSMENT/PLAN  Diagnoses and all orders for this visit:  Chronic rhinitis  -     fluticasone (Flonase) 50 mcg/actuation nasal spray; Administer 2 sprays into each nostril once daily. Shake gently. Before first use, prime pump. After use, clean tip and replace cap.  -     azelastine (Astelin) 137 mcg (0.1 %) nasal spray; Administer 2 sprays into each nostril once daily. Use in each nostril as directed    84 y.o. female with multiple ENT concerns.    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  Her evaluation is suggestive of vasomotor rhinitis given her history of negative allergy testing and the lack of response to prior therapies (INCS, oral antihistamines).  Unfortunately, she did not note significant difference with twice daily ipratropium bromide (both 0.03% and 0.06%).  On initial nasal endoscopy there is quite prominent edema and clear drainage throughout the nasal cavities.     We discussed her options including changes to medical therapy, repeat allergy testing, and/or imaging. She is interested in potentially trialing a combination of fluticasone nasal spray with azelastine on a daily basis.  She would like to follow-up in the spring to discuss any improvements.   If she remains symptomatic at that time would strongly consider imaging versus allergy testing.    Ear Popping, bilateral SNHL  The patient initially presented with ear popping, although this seems resolved.  However, audiogram does demonstrate a high-frequency sensorineural hearing loss.  She does not notice any difficulty in communication.  She is therefore not interested in hearing aids.  We discussed how there is no true treatment for hearing loss.  I advised the patient to protect their remaining hearing.  The patient should get a follow-up audiogram yearly (September 2025).    This note was created using speech recognition transcription software. Despite proofreading, typographical errors may be present that affect the meaning of the content. Please contact my office with any questions.

## 2024-12-18 NOTE — PATIENT INSTRUCTIONS
Thank you for visiting our office today. For future questions or appointments please try to call the office directly at [phone].

## 2025-04-02 ENCOUNTER — OFFICE VISIT (OUTPATIENT)
Dept: OTOLARYNGOLOGY | Facility: CLINIC | Age: 85
End: 2025-04-02
Payer: MEDICARE

## 2025-04-02 VITALS
WEIGHT: 229 LBS | OXYGEN SATURATION: 96 % | HEIGHT: 64 IN | TEMPERATURE: 98 F | BODY MASS INDEX: 39.09 KG/M2 | HEART RATE: 66 BPM | SYSTOLIC BLOOD PRESSURE: 147 MMHG | DIASTOLIC BLOOD PRESSURE: 82 MMHG

## 2025-04-02 DIAGNOSIS — J31.0 CHRONIC RHINITIS: Primary | ICD-10-CM

## 2025-04-02 DIAGNOSIS — J34.89 NASAL DRAINAGE: ICD-10-CM

## 2025-04-02 PROCEDURE — 3077F SYST BP >= 140 MM HG: CPT | Performed by: STUDENT IN AN ORGANIZED HEALTH CARE EDUCATION/TRAINING PROGRAM

## 2025-04-02 PROCEDURE — 1036F TOBACCO NON-USER: CPT | Performed by: STUDENT IN AN ORGANIZED HEALTH CARE EDUCATION/TRAINING PROGRAM

## 2025-04-02 PROCEDURE — 1160F RVW MEDS BY RX/DR IN RCRD: CPT | Performed by: STUDENT IN AN ORGANIZED HEALTH CARE EDUCATION/TRAINING PROGRAM

## 2025-04-02 PROCEDURE — 1126F AMNT PAIN NOTED NONE PRSNT: CPT | Performed by: STUDENT IN AN ORGANIZED HEALTH CARE EDUCATION/TRAINING PROGRAM

## 2025-04-02 PROCEDURE — 1159F MED LIST DOCD IN RCRD: CPT | Performed by: STUDENT IN AN ORGANIZED HEALTH CARE EDUCATION/TRAINING PROGRAM

## 2025-04-02 PROCEDURE — G2211 COMPLEX E/M VISIT ADD ON: HCPCS | Performed by: STUDENT IN AN ORGANIZED HEALTH CARE EDUCATION/TRAINING PROGRAM

## 2025-04-02 PROCEDURE — 99212 OFFICE O/P EST SF 10 MIN: CPT | Performed by: STUDENT IN AN ORGANIZED HEALTH CARE EDUCATION/TRAINING PROGRAM

## 2025-04-02 PROCEDURE — 3079F DIAST BP 80-89 MM HG: CPT | Performed by: STUDENT IN AN ORGANIZED HEALTH CARE EDUCATION/TRAINING PROGRAM

## 2025-04-02 SDOH — ECONOMIC STABILITY: FOOD INSECURITY: WITHIN THE PAST 12 MONTHS, THE FOOD YOU BOUGHT JUST DIDN'T LAST AND YOU DIDN'T HAVE MONEY TO GET MORE.: NEVER TRUE

## 2025-04-02 SDOH — ECONOMIC STABILITY: FOOD INSECURITY: WITHIN THE PAST 12 MONTHS, YOU WORRIED THAT YOUR FOOD WOULD RUN OUT BEFORE YOU GOT MONEY TO BUY MORE.: NEVER TRUE

## 2025-04-02 ASSESSMENT — PATIENT HEALTH QUESTIONNAIRE - PHQ9
SUM OF ALL RESPONSES TO PHQ QUESTIONS 1-9: 10
7. TROUBLE CONCENTRATING ON THINGS, SUCH AS READING THE NEWSPAPER OR WATCHING TELEVISION: NOT AT ALL
2. FEELING DOWN, DEPRESSED OR HOPELESS: MORE THAN HALF THE DAYS
SUM OF ALL RESPONSES TO PHQ9 QUESTIONS 1 AND 2: 4
5. POOR APPETITE OR OVEREATING: NOT AT ALL
10. IF YOU CHECKED OFF ANY PROBLEMS, HOW DIFFICULT HAVE THESE PROBLEMS MADE IT FOR YOU TO DO YOUR WORK, TAKE CARE OF THINGS AT HOME, OR GET ALONG WITH OTHER PEOPLE: SOMEWHAT DIFFICULT
9. THOUGHTS THAT YOU WOULD BE BETTER OFF DEAD, OR OF HURTING YOURSELF: NOT AT ALL
3. TROUBLE FALLING OR STAYING ASLEEP OR SLEEPING TOO MUCH: NEARLY EVERY DAY
1. LITTLE INTEREST OR PLEASURE IN DOING THINGS: MORE THAN HALF THE DAYS
4. FEELING TIRED OR HAVING LITTLE ENERGY: NEARLY EVERY DAY
8. MOVING OR SPEAKING SO SLOWLY THAT OTHER PEOPLE COULD HAVE NOTICED. OR THE OPPOSITE, BEING SO FIGETY OR RESTLESS THAT YOU HAVE BEEN MOVING AROUND A LOT MORE THAN USUAL: NOT AT ALL
6. FEELING BAD ABOUT YOURSELF - OR THAT YOU ARE A FAILURE OR HAVE LET YOURSELF OR YOUR FAMILY DOWN: NOT AT ALL

## 2025-04-02 ASSESSMENT — LIFESTYLE VARIABLES
HOW MANY STANDARD DRINKS CONTAINING ALCOHOL DO YOU HAVE ON A TYPICAL DAY: 1 OR 2
SKIP TO QUESTIONS 9-10: 1
HOW OFTEN DO YOU HAVE A DRINK CONTAINING ALCOHOL: 4 OR MORE TIMES A WEEK
AUDIT-C TOTAL SCORE: 4
HOW OFTEN DO YOU HAVE SIX OR MORE DRINKS ON ONE OCCASION: NEVER

## 2025-04-02 ASSESSMENT — ENCOUNTER SYMPTOMS
OCCASIONAL FEELINGS OF UNSTEADINESS: 0
LOSS OF SENSATION IN FEET: 0
DEPRESSION: 1

## 2025-04-02 ASSESSMENT — COLUMBIA-SUICIDE SEVERITY RATING SCALE - C-SSRS
6. HAVE YOU EVER DONE ANYTHING, STARTED TO DO ANYTHING, OR PREPARED TO DO ANYTHING TO END YOUR LIFE?: NO
2. HAVE YOU ACTUALLY HAD ANY THOUGHTS OF KILLING YOURSELF?: NO
1. IN THE PAST MONTH, HAVE YOU WISHED YOU WERE DEAD OR WISHED YOU COULD GO TO SLEEP AND NOT WAKE UP?: YES

## 2025-04-02 ASSESSMENT — PAIN SCALES - GENERAL: PAINLEVEL_OUTOF10: 0-NO PAIN

## 2025-04-02 NOTE — PROGRESS NOTES
SUBJECTIVE  Patient ID: Lennie Castro is a 84 y.o. female who presents for Follow-up.    History 6/19/2024:  This is an 82y/o female who presented to clinic today with a history of chronic anterior rhinorrhea and intermittent ear popping. She states that she has dealt with the rhinorrhea for many years now. It is clear mucus, constant throughout the day, bilateral, with no associated nasal congestion or post nasal drip. She denies salty/metallic taste, associated headaches, prior nasal or sinus surgeries. No prior head trauma. An allergy test a few years ago was negative. She has not had any improvement in her symptoms with trials of Allegra, Claritin, Zyrtec, Sudafed and Flonase. She has not tried any other nasal sprays.     Secondly, she reports a history of intermittent ear popping that has been equal bilaterally. She denies any subjective hearing loss, tinnitus, vertigo, otorrhea, recurrent ear infections or ear surgeries. She had an hearing testing 5 years ago which came back normal.     Update 12/18/2024:  Since last visit her nose continues to have constant dripping despite increased dosing of ipratropium. If anything she feels that her throat phlegm is thicker/worse.    Update 4/2/2025:  Follow-up for chronic rhinitis. She reports that she tried the combination of Flonase, azelastine, and Allegra without much change.    OBJECTIVE  Physical Exam  CONSTITUTIONAL: Well appearing female who appears stated age.  PSYCHIATRIC: Alert, appropriate mood and affect.  RESPIRATORY: Normal inspiration and expiration and chest wall expansion; no use of accessory muscles to breathe.  VOICE: Clear speech without hoarseness. No stridor nor stertor.  HEAD, FACE, AND SKIN: Symmetric facial feature.  NOSE: Nasal dorsum was midline. Anterior rhinoscopy demonstrated a septal deviation to the right superiorly and left NSD inferiorly.  There is mild ITH bilaterally. Nasal cavities otherwise patent with no obvious nasal masses,  polyps, mucopurulence, nor other lesions were appreciated.  OROPHARYNX: No lesion nor mucosal abnormality. The uvula was normal appearing. The tonsils were surgically absent. No drainage.    --------------------------------------------------  Audiology:  9/25/2024. This demonstrated normal a symmetric mild sloping to normal sloping to severe sensorineural hearing loss bilaterally.  She had excellent word recognition scores, type A tympanograms.    --------------------------------------------------    ASSESSMENT/PLAN  Diagnoses and all orders for this visit:  Chronic rhinitis  Nasal drainage      84 y.o. female with multiple ENT concerns.    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  Her evaluation is suggestive of vasomotor rhinitis given her history of negative allergy testing and the lack of response to prior therapies (INCS, oral antihistamines).  Unfortunately, she did not note significant difference with twice daily ipratropium bromide (both 0.03% and 0.06%).  On initial nasal endoscopy there was quite prominent edema and clear drainage throughout the nasal cavities.    Since her last visit the patient has potentially noticed some mild improvement with a combination fluticasone and azelastine nasal sprays as well as of daily Allegra.  We discussed her options including changes to medical therapy, repeat allergy testing, and/or imaging.  For now she is interested in continuing her current medical therapy.    Ear Popping, bilateral SNHL  The patient initially presented with ear popping, although this seems resolved.  However, audiogram does demonstrate a high-frequency sensorineural hearing loss.  She does not notice any difficulty in communication.  She is therefore not interested in hearing aids.  We discussed how there is no true treatment for hearing loss.  I advised the patient to protect their remaining hearing.  The patient should get a  follow-up audiogram yearly (September 2025).    Follow-up in the fall with audiogram.  If symptoms are stable at that time consider yearly evaluations.    This note was created using speech recognition transcription software. Despite proofreading, typographical errors may be present that affect the meaning of the content. Please contact my office with any questions.

## 2025-04-30 ENCOUNTER — APPOINTMENT (OUTPATIENT)
Dept: URGENT CARE | Age: 85
End: 2025-04-30
Payer: MEDICARE

## 2025-06-26 PROBLEM — E66.812 CLASS 2 OBESITY WITH BODY MASS INDEX (BMI) OF 39.0 TO 39.9 IN ADULT: Status: ACTIVE | Noted: 2025-06-26

## 2025-07-09 ENCOUNTER — APPOINTMENT (OUTPATIENT)
Dept: CARDIOLOGY | Facility: CLINIC | Age: 85
End: 2025-07-09
Payer: MEDICARE

## 2025-07-09 ENCOUNTER — OFFICE (OUTPATIENT)
Dept: URBAN - METROPOLITAN AREA CLINIC 26 | Facility: CLINIC | Age: 85
End: 2025-07-09
Payer: MEDICARE

## 2025-07-09 VITALS
HEART RATE: 83 BPM | SYSTOLIC BLOOD PRESSURE: 133 MMHG | TEMPERATURE: 97.9 F | HEIGHT: 63 IN | WEIGHT: 221 LBS | DIASTOLIC BLOOD PRESSURE: 78 MMHG

## 2025-07-09 DIAGNOSIS — K92.1 MELENA: ICD-10-CM

## 2025-07-09 DIAGNOSIS — R53.83 OTHER FATIGUE: ICD-10-CM

## 2025-07-09 DIAGNOSIS — Z86.0100 PERSONAL HISTORY OF COLON POLYPS, UNSPECIFIED: ICD-10-CM

## 2025-07-09 PROCEDURE — 99204 OFFICE O/P NEW MOD 45 MIN: CPT | Performed by: INTERNAL MEDICINE

## 2025-07-14 ENCOUNTER — APPOINTMENT (OUTPATIENT)
Dept: CARDIOLOGY | Facility: CLINIC | Age: 85
End: 2025-07-14
Payer: MEDICARE

## 2025-07-14 VITALS
DIASTOLIC BLOOD PRESSURE: 76 MMHG | BODY MASS INDEX: 38.58 KG/M2 | SYSTOLIC BLOOD PRESSURE: 124 MMHG | HEART RATE: 79 BPM | WEIGHT: 226 LBS | HEIGHT: 64 IN | OXYGEN SATURATION: 94 %

## 2025-07-14 DIAGNOSIS — I25.10 CORONARY ARTERY DISEASE INVOLVING NATIVE CORONARY ARTERY OF NATIVE HEART WITHOUT ANGINA PECTORIS: ICD-10-CM

## 2025-07-14 DIAGNOSIS — E78.5 HYPERLIPIDEMIA, UNSPECIFIED HYPERLIPIDEMIA TYPE: ICD-10-CM

## 2025-07-14 DIAGNOSIS — I10 PRIMARY HYPERTENSION: Primary | ICD-10-CM

## 2025-07-14 DIAGNOSIS — R06.09 DYSPNEA ON EXERTION: ICD-10-CM

## 2025-07-14 DIAGNOSIS — I34.0 MODERATE MITRAL REGURGITATION: ICD-10-CM

## 2025-07-14 PROCEDURE — 99213 OFFICE O/P EST LOW 20 MIN: CPT | Performed by: INTERNAL MEDICINE

## 2025-07-14 PROCEDURE — 3078F DIAST BP <80 MM HG: CPT | Performed by: INTERNAL MEDICINE

## 2025-07-14 PROCEDURE — 1159F MED LIST DOCD IN RCRD: CPT | Performed by: INTERNAL MEDICINE

## 2025-07-14 PROCEDURE — 93000 ELECTROCARDIOGRAM COMPLETE: CPT | Performed by: INTERNAL MEDICINE

## 2025-07-14 PROCEDURE — 1036F TOBACCO NON-USER: CPT | Performed by: INTERNAL MEDICINE

## 2025-07-14 PROCEDURE — 3074F SYST BP LT 130 MM HG: CPT | Performed by: INTERNAL MEDICINE

## 2025-07-14 NOTE — PROGRESS NOTES
"Subjective   Lennie Castro is a 85 y.o. female.    Chief Complaint:  Exertional dyspnea and fatigue.  Follow-up coronary artery disease.    HPI    Over the past year she has remained asymptomatic from a cardiac standpoint.  She has had some sinus issues and some arthritis issues involving her hands and her knees.  No chest discomfort or pressure.  Mild exertional dyspnea unchanged from prior evaluations.    Her diagnosis of coronary artery disease is based on a positive calcium score of 1302 consistent with the presence of extensive severe atherosclerotic coronary artery disease. This study was done in March 2022     Her cardiac history is significant for hypertension. There is no history of diabetes. She did smoke in the past from age 21 to age 36. At that time she was smoking about a half a pack of cigarettes per day. She denies a family history of premature coronary artery disease.     Past medical history significant for hiatal hernia. She has a past history of anemia. She has seasonal allergies. She has had left knee surgery. She has had a tonsillectomy and appendectomy. There is a history of hypothyroidism. She has problems with insomnia.      Allergies  NoKnown    · No Known Allergies     Family History  Father    · Family history of Heart problem     Social History  Problems    · Alcohol use (V49.89) (Z78.9)   · Former smoker (V15.82) (Z87.891)   · smoked for 10-15 years, quit 30 years ago   ·      Review of Systems   Constitutional: Positive for malaise/fatigue.   Cardiovascular:  Positive for dyspnea on exertion.   Musculoskeletal:  Positive for arthritis.   All other systems reviewed and are negative.      Current Medications[1]     Visit Vitals  /76   Pulse 79   Ht 1.626 m (5' 4\")   Wt 103 kg (226 lb)   SpO2 94%   BMI 38.79 kg/m²   Smoking Status Former   BSA 2.16 m²        Objective     Constitutional:       Appearance: Not in distress.   Neck:      Vascular: JVD normal.   Pulmonary:      " Breath sounds: Normal breath sounds.   Cardiovascular:      Normal rate. Regular rhythm. Normal S1. Normal S2.       Murmurs: There is no murmur.      No gallop.    Pulses:     Intact distal pulses.   Edema:     Peripheral edema absent.   Abdominal:      General: There is no distension.      Palpations: Abdomen is soft.   Neurological:      Mental Status: Alert.         Lab Review:   Lab Results   Component Value Date     05/04/2023    K 4.2 05/04/2023     05/04/2023    CO2 29 05/04/2023    BUN 34 (H) 05/04/2023    CREATININE 1.04 05/04/2023    GLUCOSE 87 05/04/2023    CALCIUM 9.8 05/04/2023     Lab Results   Component Value Date    WBC 7.6 05/04/2023    HGB 11.8 (L) 05/04/2023    HCT 37.9 05/04/2023    MCV 99 05/04/2023     05/04/2023         Assessment:    1.  Coronary disease.  Based on the strongly positive calcium score of 1302.  No anginal symptoms.  Her latest stress thallium showed no ischemia with normal left ventricular function.    2.  Hypertension.  Blood pressures are excellent.    3.  Exertional dyspnea.  Her latest echocardiographic study done 1 year ago showed normal left ventricular function.    4.  Moderate mitral regurgitation.  No evidence of left heart failure.    5.  Hyperlipidemia.  Followed by primary care.         [1]   Current Outpatient Medications   Medication Sig Dispense Refill    albuterol (ProAir HFA) 90 mcg/actuation inhaler Inhale 2 puffs every 4 hours if needed.      aspirin 81 mg EC tablet Take 1 tablet (81 mg) by mouth once daily.      azelastine (Astelin) 137 mcg (0.1 %) nasal spray Administer 2 sprays into each nostril once daily. Use in each nostril as directed 30 mL 11    Breo Ellipta 100-25 mcg/dose inhaler       escitalopram (Lexapro) 20 mg tablet       ferrous sulfate, 325 mg ferrous sulfate, tablet Take 1 tablet by mouth once daily.      FLUoxetine (PROzac) 20 mg capsule Take by mouth.      fluticasone (Flonase) 50 mcg/actuation nasal spray Administer 2  sprays into each nostril once daily. Shake gently. Before first use, prime pump. After use, clean tip and replace cap. 16 g 11    hydroCHLOROthiazide (Microzide) 12.5 mg tablet Take 1 tablet (12.5 mg) by mouth once daily. 90 tablet 3    ipratropium (Atrovent) 42 mcg (0.06 %) nasal spray Administer 2 sprays into each nostril 3 times a day as needed for rhinitis. 15 mL 3    levothyroxine (Synthroid, Levoxyl) 100 mcg tablet       losartan (Cozaar) 100 mg tablet Take 1 tablet (100 mg) by mouth once daily. 90 tablet 3    montelukast (Singulair) 10 mg tablet       nitroglycerin (Nitrostat) 0.4 mg SL tablet Place under the tongue every 5 minutes if needed.      omeprazole (PriLOSEC) 20 mg DR capsule       QUEtiapine (SEROquel) 50 mg tablet       rosuvastatin (Crestor) 40 mg tablet Take 1 tablet (40 mg) by mouth once daily. 90 tablet 3    alendronate (Fosamax) 35 mg tablet Take by mouth. (Patient not taking: Reported on 7/14/2025)      clonazePAM (KlonoPIN) 0.5 mg tablet        No current facility-administered medications for this visit.

## 2025-08-04 VITALS
HEART RATE: 79 BPM | HEART RATE: 74 BPM | SYSTOLIC BLOOD PRESSURE: 93 MMHG | SYSTOLIC BLOOD PRESSURE: 112 MMHG | OXYGEN SATURATION: 96 % | SYSTOLIC BLOOD PRESSURE: 127 MMHG | DIASTOLIC BLOOD PRESSURE: 56 MMHG | SYSTOLIC BLOOD PRESSURE: 109 MMHG | OXYGEN SATURATION: 98 % | RESPIRATION RATE: 10 BRPM | SYSTOLIC BLOOD PRESSURE: 94 MMHG | SYSTOLIC BLOOD PRESSURE: 157 MMHG | RESPIRATION RATE: 9 BRPM | SYSTOLIC BLOOD PRESSURE: 108 MMHG | RESPIRATION RATE: 16 BRPM | SYSTOLIC BLOOD PRESSURE: 119 MMHG | SYSTOLIC BLOOD PRESSURE: 109 MMHG | RESPIRATION RATE: 22 BRPM | DIASTOLIC BLOOD PRESSURE: 91 MMHG | OXYGEN SATURATION: 86 % | DIASTOLIC BLOOD PRESSURE: 61 MMHG | SYSTOLIC BLOOD PRESSURE: 137 MMHG | OXYGEN SATURATION: 98 % | RESPIRATION RATE: 10 BRPM | SYSTOLIC BLOOD PRESSURE: 140 MMHG | SYSTOLIC BLOOD PRESSURE: 109 MMHG | HEART RATE: 84 BPM | SYSTOLIC BLOOD PRESSURE: 94 MMHG | RESPIRATION RATE: 9 BRPM | DIASTOLIC BLOOD PRESSURE: 91 MMHG | DIASTOLIC BLOOD PRESSURE: 57 MMHG | DIASTOLIC BLOOD PRESSURE: 64 MMHG | DIASTOLIC BLOOD PRESSURE: 70 MMHG | HEART RATE: 64 BPM | OXYGEN SATURATION: 96 % | RESPIRATION RATE: 22 BRPM | RESPIRATION RATE: 9 BRPM | HEART RATE: 70 BPM | TEMPERATURE: 97.3 F | DIASTOLIC BLOOD PRESSURE: 35 MMHG | SYSTOLIC BLOOD PRESSURE: 134 MMHG | OXYGEN SATURATION: 95 % | RESPIRATION RATE: 5 BRPM | DIASTOLIC BLOOD PRESSURE: 70 MMHG | DIASTOLIC BLOOD PRESSURE: 79 MMHG | SYSTOLIC BLOOD PRESSURE: 163 MMHG | SYSTOLIC BLOOD PRESSURE: 153 MMHG | DIASTOLIC BLOOD PRESSURE: 53 MMHG | SYSTOLIC BLOOD PRESSURE: 133 MMHG | HEIGHT: 63 IN | OXYGEN SATURATION: 95 % | DIASTOLIC BLOOD PRESSURE: 99 MMHG | RESPIRATION RATE: 18 BRPM | DIASTOLIC BLOOD PRESSURE: 79 MMHG | SYSTOLIC BLOOD PRESSURE: 116 MMHG | OXYGEN SATURATION: 95 % | DIASTOLIC BLOOD PRESSURE: 64 MMHG | DIASTOLIC BLOOD PRESSURE: 91 MMHG | SYSTOLIC BLOOD PRESSURE: 116 MMHG | RESPIRATION RATE: 18 BRPM | SYSTOLIC BLOOD PRESSURE: 116 MMHG | HEART RATE: 79 BPM | HEART RATE: 78 BPM | TEMPERATURE: 97.3 F | RESPIRATION RATE: 20 BRPM | RESPIRATION RATE: 15 BRPM | HEART RATE: 82 BPM | SYSTOLIC BLOOD PRESSURE: 119 MMHG | HEART RATE: 98 BPM | DIASTOLIC BLOOD PRESSURE: 77 MMHG | HEART RATE: 76 BPM | HEART RATE: 73 BPM | OXYGEN SATURATION: 99 % | TEMPERATURE: 97.3 F | SYSTOLIC BLOOD PRESSURE: 137 MMHG | HEART RATE: 64 BPM | HEART RATE: 77 BPM | SYSTOLIC BLOOD PRESSURE: 157 MMHG | SYSTOLIC BLOOD PRESSURE: 108 MMHG | SYSTOLIC BLOOD PRESSURE: 108 MMHG | HEART RATE: 83 BPM | HEART RATE: 76 BPM | DIASTOLIC BLOOD PRESSURE: 35 MMHG | DIASTOLIC BLOOD PRESSURE: 99 MMHG | SYSTOLIC BLOOD PRESSURE: 111 MMHG | DIASTOLIC BLOOD PRESSURE: 35 MMHG | DIASTOLIC BLOOD PRESSURE: 61 MMHG | RESPIRATION RATE: 22 BRPM | RESPIRATION RATE: 16 BRPM | DIASTOLIC BLOOD PRESSURE: 61 MMHG | OXYGEN SATURATION: 100 % | SYSTOLIC BLOOD PRESSURE: 119 MMHG | RESPIRATION RATE: 13 BRPM | RESPIRATION RATE: 13 BRPM | RESPIRATION RATE: 8 BRPM | HEART RATE: 70 BPM | OXYGEN SATURATION: 100 % | RESPIRATION RATE: 13 BRPM | HEART RATE: 83 BPM | HEART RATE: 76 BPM | SYSTOLIC BLOOD PRESSURE: 133 MMHG | HEART RATE: 87 BPM | RESPIRATION RATE: 15 BRPM | HEART RATE: 64 BPM | RESPIRATION RATE: 8 BRPM | RESPIRATION RATE: 16 BRPM | HEART RATE: 70 BPM | WEIGHT: 213 LBS | HEART RATE: 78 BPM | DIASTOLIC BLOOD PRESSURE: 50 MMHG | HEART RATE: 117 BPM | HEART RATE: 74 BPM | DIASTOLIC BLOOD PRESSURE: 45 MMHG | RESPIRATION RATE: 20 BRPM | OXYGEN SATURATION: 94 % | DIASTOLIC BLOOD PRESSURE: 77 MMHG | DIASTOLIC BLOOD PRESSURE: 66 MMHG | RESPIRATION RATE: 8 BRPM | HEART RATE: 73 BPM | OXYGEN SATURATION: 96 % | OXYGEN SATURATION: 91 % | DIASTOLIC BLOOD PRESSURE: 53 MMHG | DIASTOLIC BLOOD PRESSURE: 45 MMHG | OXYGEN SATURATION: 100 % | SYSTOLIC BLOOD PRESSURE: 146 MMHG | DIASTOLIC BLOOD PRESSURE: 57 MMHG | OXYGEN SATURATION: 91 % | DIASTOLIC BLOOD PRESSURE: 79 MMHG | WEIGHT: 213 LBS | RESPIRATION RATE: 15 BRPM | OXYGEN SATURATION: 94 % | WEIGHT: 213 LBS | OXYGEN SATURATION: 97 % | RESPIRATION RATE: 5 BRPM | SYSTOLIC BLOOD PRESSURE: 146 MMHG | RESPIRATION RATE: 20 BRPM | OXYGEN SATURATION: 91 % | HEART RATE: 87 BPM | SYSTOLIC BLOOD PRESSURE: 157 MMHG | SYSTOLIC BLOOD PRESSURE: 133 MMHG | SYSTOLIC BLOOD PRESSURE: 94 MMHG | OXYGEN SATURATION: 98 % | SYSTOLIC BLOOD PRESSURE: 114 MMHG | HEART RATE: 98 BPM | HEART RATE: 84 BPM | HEART RATE: 82 BPM | HEART RATE: 73 BPM | SYSTOLIC BLOOD PRESSURE: 111 MMHG | SYSTOLIC BLOOD PRESSURE: 114 MMHG | OXYGEN SATURATION: 93 % | SYSTOLIC BLOOD PRESSURE: 140 MMHG | SYSTOLIC BLOOD PRESSURE: 112 MMHG | HEIGHT: 63 IN | HEART RATE: 84 BPM | SYSTOLIC BLOOD PRESSURE: 111 MMHG | DIASTOLIC BLOOD PRESSURE: 57 MMHG | OXYGEN SATURATION: 86 % | HEART RATE: 117 BPM | HEART RATE: 78 BPM | RESPIRATION RATE: 14 BRPM | DIASTOLIC BLOOD PRESSURE: 56 MMHG | OXYGEN SATURATION: 93 % | RESPIRATION RATE: 10 BRPM | DIASTOLIC BLOOD PRESSURE: 50 MMHG | HEART RATE: 74 BPM | OXYGEN SATURATION: 92 % | DIASTOLIC BLOOD PRESSURE: 77 MMHG | DIASTOLIC BLOOD PRESSURE: 64 MMHG | HEART RATE: 77 BPM | DIASTOLIC BLOOD PRESSURE: 70 MMHG | OXYGEN SATURATION: 97 % | DIASTOLIC BLOOD PRESSURE: 66 MMHG | RESPIRATION RATE: 14 BRPM | HEIGHT: 63 IN | HEART RATE: 98 BPM | DIASTOLIC BLOOD PRESSURE: 66 MMHG | HEART RATE: 82 BPM | OXYGEN SATURATION: 94 % | OXYGEN SATURATION: 99 % | RESPIRATION RATE: 18 BRPM | SYSTOLIC BLOOD PRESSURE: 93 MMHG | SYSTOLIC BLOOD PRESSURE: 153 MMHG | DIASTOLIC BLOOD PRESSURE: 99 MMHG | SYSTOLIC BLOOD PRESSURE: 127 MMHG | SYSTOLIC BLOOD PRESSURE: 134 MMHG | OXYGEN SATURATION: 92 % | HEART RATE: 62 BPM | HEART RATE: 62 BPM | OXYGEN SATURATION: 99 % | DIASTOLIC BLOOD PRESSURE: 50 MMHG | DIASTOLIC BLOOD PRESSURE: 53 MMHG | DIASTOLIC BLOOD PRESSURE: 56 MMHG | SYSTOLIC BLOOD PRESSURE: 127 MMHG | SYSTOLIC BLOOD PRESSURE: 146 MMHG | HEART RATE: 77 BPM | HEART RATE: 87 BPM | OXYGEN SATURATION: 92 % | SYSTOLIC BLOOD PRESSURE: 112 MMHG | HEART RATE: 62 BPM | OXYGEN SATURATION: 97 % | OXYGEN SATURATION: 93 % | RESPIRATION RATE: 14 BRPM | RESPIRATION RATE: 5 BRPM | SYSTOLIC BLOOD PRESSURE: 153 MMHG | SYSTOLIC BLOOD PRESSURE: 134 MMHG | SYSTOLIC BLOOD PRESSURE: 93 MMHG | OXYGEN SATURATION: 86 % | SYSTOLIC BLOOD PRESSURE: 163 MMHG | SYSTOLIC BLOOD PRESSURE: 114 MMHG | SYSTOLIC BLOOD PRESSURE: 137 MMHG | SYSTOLIC BLOOD PRESSURE: 163 MMHG | HEART RATE: 79 BPM | HEART RATE: 83 BPM | HEART RATE: 117 BPM | SYSTOLIC BLOOD PRESSURE: 140 MMHG | DIASTOLIC BLOOD PRESSURE: 45 MMHG

## 2025-08-05 ENCOUNTER — AMBULATORY SURGICAL CENTER (OUTPATIENT)
Dept: URBAN - METROPOLITAN AREA SURGERY 12 | Facility: SURGERY | Age: 85
End: 2025-08-05
Payer: MEDICARE

## 2025-08-05 DIAGNOSIS — D12.2 BENIGN NEOPLASM OF ASCENDING COLON: ICD-10-CM

## 2025-08-05 DIAGNOSIS — K64.4 RESIDUAL HEMORRHOIDAL SKIN TAGS: ICD-10-CM

## 2025-08-05 DIAGNOSIS — K57.30 DIVERTICULOSIS OF LARGE INTESTINE WITHOUT PERFORATION OR ABS: ICD-10-CM

## 2025-08-05 DIAGNOSIS — D12.0 BENIGN NEOPLASM OF CECUM: ICD-10-CM

## 2025-08-05 DIAGNOSIS — K64.8 OTHER HEMORRHOIDS: ICD-10-CM

## 2025-08-05 DIAGNOSIS — K92.1 MELENA: ICD-10-CM

## 2025-08-05 DIAGNOSIS — K63.5 POLYP OF COLON: ICD-10-CM

## 2025-08-05 PROCEDURE — 45385 COLONOSCOPY W/LESION REMOVAL: CPT | Performed by: INTERNAL MEDICINE

## 2025-08-05 PROCEDURE — 45380 COLONOSCOPY AND BIOPSY: CPT | Mod: 59 | Performed by: INTERNAL MEDICINE

## 2025-08-08 DIAGNOSIS — I10 PRIMARY HYPERTENSION: Primary | ICD-10-CM

## 2025-08-08 NOTE — PROGRESS NOTES
History Of Present Illness  HPI     Past medical history:  Hypertension  Coronary artery disease.  Stress thallium showed no ischemia with normal LV function.  Exertional dyspnea.  Echocardiogram 1 year ago showed normal LV function.  Moderate mitral regurgitation.  Follows with Dr. Petit.    Past Medical History  She has a past medical history of Asthma, COPD (chronic obstructive pulmonary disease) (Multi), Essential (primary) hypertension (12/18/2022), High blood pressure, Personal history of other diseases of the nervous system and sense organs, Personal history of other diseases of the respiratory system, Personal history of other endocrine, nutritional and metabolic disease, Pure hypercholesterolemia, unspecified, Sleep apnea, and Unspecified cataract.    Surgical History  She has a past surgical history that includes Tonsillectomy (06/09/2017); Knee surgery (06/09/2017); Cataract extraction (06/09/2017); and Other surgical history (08/18/2021).     Allergies  Celecoxib    Social History  She reports that she has quit smoking. Her smoking use included cigarettes. She has never used smokeless tobacco. She reports current alcohol use of about 7.0 - 14.0 standard drinks of alcohol per week. She reports that she does not use drugs.    Family History  Family History[1]    Review of Systems   ***     Last Recorded Vitals  There were no vitals taken for this visit.    Physical Exam   ***    Relevant Results  Labs from July 9, 2025: WBC 7.3, hemoglobin 12.3, platelet 318  Calcium 10.4, other electrolytes normal, BUN 23, creatinine 1.04, glucose 95     Assessment/Plan   {[email or web address]:[id number]}          Raymond Lake MD       [1]   Family History  Problem Relation Name Age of Onset    Asthma Other      Heart disease Other        "Systems:  Constitutional:  no fever, no chills, no significant weight change  Neurological: No history of CVA or seizure disorder  Eyes: No pain, no recent visual change  ENT:  No recent hearing loss  Neck: No pain  Cardiovascular: As above  Pulmonary: No shortness of breath, no history of pulmonary disease such as pneumonia or COPD  Breast: No history of breast disease or breast mass  Gastrointestinal:   no abdominal pain, no nausea or vomiting.  No history of ulcers.  No liver, gallbladder or pancreas disease.   Genitourinary: No hematuria or dysuria, no kidney disease  Musculoskeletal:  no arthralgia, no muscle or bone pain  Integumentary:  no rash  Psychiatric:  No anxiety or depression  Endocrine:  no history of diabetes  Hematologic/Lymphatic: No easy bruising or bleeding      Last Recorded Vitals  Blood pressure 156/80, pulse 72, temperature 36.3 °C (97.4 °F), temperature source Temporal, resp. rate 16, height 1.626 m (5' 4\"), weight 102 kg (225 lb 12.8 oz), SpO2 94%.    Physical Exam  Constitutional: Well-developed, well-nourished, obese, alert and oriented, no acute distress  Skin: Warm and dry, no lesions, no rashes, no jaundice  HEENT: Normocephalic, atraumatic, EOMI, no scleral icterus, eyes have no redness or swelling or discharge, external inspection of ears and nose is normal, mucous membranes moist  Neck: Soft, nontender, no mass or adenopathy  Cardiac: Regular rate and rhythm, no murmur  Chest: Patent airway, clear to auscultation, normal breath sounds with good chest expansion, no wheezes or rales or rhonchi noted, thorax symmetric  Abdomen: Nondistended, positive bowel sounds, soft, nontender, no mass  Rectal: Small to medium size circumferential internal/external hemorrhoids, nontender, slightly decreased sphincter tone, no internal mass.  Extremities: No injury, no lower extremity edema or calf tenderness  Lymphatic: No cervical adenopathy  Musculoskeletal: Range of motion intact, no joint " swelling, normal strength  Neurological: Alert and oriented x3, intact sensory and motor function, no obvious focal neurologic abnormalities, normal gait  Psychological: Appropriate mood and behavior  Examination chaperoned by Dolores Springer    Relevant Results  Labs from July 9, 2025: WBC 7.3, hemoglobin 12.3, platelet 318  Calcium 10.4, other electrolytes normal, BUN 23, creatinine 1.04, glucose 95    Anoscopy:  Small internal hemorrhoids without bleeding, inflammation or signs of recent bleeding.  No mass.    Assessment/Plan   Diagnoses and all orders for this visit:  Internal and external bleeding hemorrhoids  85-year-old female with internal/external hemorrhoids and intermittent rectal bleeding.  No bleeding since colonoscopy.  I discussed the options of nonoperative versus operative treatment of the hemorrhoids.  Recommend initial nonoperative treatment with high-fiber diet, fiber supplement, increased oral fluid intake.  Avoid sitting and straining for prolonged periods.  Sitz bath's as needed.  Follow-up as needed if hemorrhoid symptoms persist.      Raymond Lake MD         [1]   Family History  Problem Relation Name Age of Onset    Asthma Other      Heart disease Other

## 2025-08-11 ENCOUNTER — APPOINTMENT (OUTPATIENT)
Dept: SURGERY | Facility: CLINIC | Age: 85
End: 2025-08-11
Payer: MEDICARE

## 2025-08-11 VITALS
WEIGHT: 225.8 LBS | HEIGHT: 64 IN | BODY MASS INDEX: 38.55 KG/M2 | SYSTOLIC BLOOD PRESSURE: 156 MMHG | RESPIRATION RATE: 16 BRPM | HEART RATE: 72 BPM | OXYGEN SATURATION: 94 % | DIASTOLIC BLOOD PRESSURE: 80 MMHG | TEMPERATURE: 97.4 F

## 2025-08-11 DIAGNOSIS — K64.8 INTERNAL AND EXTERNAL BLEEDING HEMORRHOIDS: Primary | ICD-10-CM

## 2025-08-11 DIAGNOSIS — K64.4 INTERNAL AND EXTERNAL BLEEDING HEMORRHOIDS: Primary | ICD-10-CM

## 2025-08-11 PROCEDURE — 3077F SYST BP >= 140 MM HG: CPT | Performed by: SURGERY

## 2025-08-11 PROCEDURE — 46600 DIAGNOSTIC ANOSCOPY SPX: CPT | Performed by: SURGERY

## 2025-08-11 PROCEDURE — 1159F MED LIST DOCD IN RCRD: CPT | Performed by: SURGERY

## 2025-08-11 PROCEDURE — 99203 OFFICE O/P NEW LOW 30 MIN: CPT | Performed by: SURGERY

## 2025-08-11 PROCEDURE — 3079F DIAST BP 80-89 MM HG: CPT | Performed by: SURGERY

## 2025-08-11 PROCEDURE — 1126F AMNT PAIN NOTED NONE PRSNT: CPT | Performed by: SURGERY

## 2025-08-11 RX ORDER — HYDROCHLOROTHIAZIDE 12.5 MG/1
12.5 TABLET ORAL DAILY
Qty: 90 TABLET | Refills: 3 | Status: SHIPPED | OUTPATIENT
Start: 2025-08-11

## 2025-08-11 RX ORDER — BETAMETHASONE DIPROPIONATE 0.5 MG/G
CREAM TOPICAL
COMMUNITY
Start: 2025-08-01

## 2025-08-11 ASSESSMENT — PAIN SCALES - GENERAL: PAINLEVEL_OUTOF10: 0-NO PAIN

## 2025-08-11 NOTE — LETTER
August 11, 2025     Ryder Elias MD  [id number] [facility] [address]    Patient: Lennie Castro   YOB: 1940   Date of Visit: 8/11/2025       Dear Dr. Ryder Elias MD:    Thank you for referring Lennie Castro to me for evaluation. Below are my notes for this consultation.  If you have questions, please do not hesitate to call me. I look forward to following your patient along with you.       Sincerely,     Raymond Lake MD      CC: No Recipients  ______________________________________________________________________________________    History Of Present Illness  HPI   The patient is an 85-year-old female with recent intermittent episodes of rectal bleeding.  The patient states the bleeding did not occur often but the toilet water would become bright red.  She has no anal pain.  She feels a small lump on the left side of her anus..  She has chronic constipation.  No problems with diarrhea or fecal incontinence.  She had a colonoscopy for evaluation of the rectal bleeding about 10 days ago and reports that she was found to have hemorrhoids and 4 polyps.  No further bleeding since her colonoscopy.  She has started taking Metamucil and as needed MiraLAX.    Past medical history:  Hypertension  Coronary artery disease.  Stress thallium showed no ischemia with normal LV function.  Exertional dyspnea.  Echocardiogram 1 year ago showed normal LV function.  Moderate mitral regurgitation.  Follows with Dr. Petit.    Past Medical History  She has a past medical history of Asthma, COPD (chronic obstructive pulmonary disease) (Multi), Essential (primary) hypertension (12/18/2022), High blood pressure, Personal history of other diseases of the nervous system and sense organs, Personal history of other diseases of the respiratory system, Personal history of other endocrine, nutritional and metabolic disease, Pure hypercholesterolemia, unspecified, Sleep apnea, and Unspecified cataract.    Surgical  "History  She has a past surgical history that includes Tonsillectomy (06/09/2017); Knee surgery (06/09/2017); Cataract extraction (06/09/2017); and Other surgical history (08/18/2021).     Allergies  Celecoxib    Social History  She reports that she quit smoking about 50 years ago. Her smoking use included cigarettes. She has never used smokeless tobacco. She reports current alcohol use of about 7.0 - 14.0 standard drinks of alcohol per week. She reports that she does not use drugs.    Family History  Family History[1]    Review of Systems  Review of Systems:  Constitutional:  no fever, no chills, no significant weight change  Neurological: No history of CVA or seizure disorder  Eyes: No pain, no recent visual change  ENT:  No recent hearing loss  Neck: No pain  Cardiovascular: As above  Pulmonary: No shortness of breath, no history of pulmonary disease such as pneumonia or COPD  Breast: No history of breast disease or breast mass  Gastrointestinal:   no abdominal pain, no nausea or vomiting.  No history of ulcers.  No liver, gallbladder or pancreas disease.   Genitourinary: No hematuria or dysuria, no kidney disease  Musculoskeletal:  no arthralgia, no muscle or bone pain  Integumentary:  no rash  Psychiatric:  No anxiety or depression  Endocrine:  no history of diabetes  Hematologic/Lymphatic: No easy bruising or bleeding      Last Recorded Vitals  Blood pressure 156/80, pulse 72, temperature 36.3 °C (97.4 °F), temperature source Temporal, resp. rate 16, height 1.626 m (5' 4\"), weight 102 kg (225 lb 12.8 oz), SpO2 94%.    Physical Exam  Constitutional: Well-developed, well-nourished, obese, alert and oriented, no acute distress  Skin: Warm and dry, no lesions, no rashes, no jaundice  HEENT: Normocephalic, atraumatic, EOMI, no scleral icterus, eyes have no redness or swelling or discharge, external inspection of ears and nose is normal, mucous membranes moist  Neck: Soft, nontender, no mass or adenopathy  Cardiac: " Regular rate and rhythm, no murmur  Chest: Patent airway, clear to auscultation, normal breath sounds with good chest expansion, no wheezes or rales or rhonchi noted, thorax symmetric  Abdomen: Nondistended, positive bowel sounds, soft, nontender, no mass  Rectal: Small to medium size circumferential internal/external hemorrhoids, nontender, slightly decreased sphincter tone, no internal mass.  Extremities: No injury, no lower extremity edema or calf tenderness  Lymphatic: No cervical adenopathy  Musculoskeletal: Range of motion intact, no joint swelling, normal strength  Neurological: Alert and oriented x3, intact sensory and motor function, no obvious focal neurologic abnormalities, normal gait  Psychological: Appropriate mood and behavior  Examination chaperoned by Dolores Springer    Relevant Results  Labs from July 9, 2025: WBC 7.3, hemoglobin 12.3, platelet 318  Calcium 10.4, other electrolytes normal, BUN 23, creatinine 1.04, glucose 95    Anoscopy:  Small internal hemorrhoids without bleeding, inflammation or signs of recent bleeding.  No mass.    Assessment/Plan   Diagnoses and all orders for this visit:  Internal and external bleeding hemorrhoids  85-year-old female with internal/external hemorrhoids and intermittent rectal bleeding.  No bleeding since colonoscopy.  I discussed the options of nonoperative versus operative treatment of the hemorrhoids.  Recommend initial nonoperative treatment with high-fiber diet, fiber supplement, increased oral fluid intake.  Avoid sitting and straining for prolonged periods.  Sitz bath's as needed.  Follow-up as needed if hemorrhoid symptoms persist.      Raymond Lake MD         [1]  Family History  Problem Relation Name Age of Onset   • Asthma Other     • Heart disease Other          [1]  Family History  Problem Relation Name Age of Onset   • Asthma Other     • Heart disease Other

## 2025-08-18 DIAGNOSIS — E78.5 HYPERLIPIDEMIA, UNSPECIFIED HYPERLIPIDEMIA TYPE: ICD-10-CM

## 2025-08-19 RX ORDER — ROSUVASTATIN CALCIUM 40 MG/1
40 TABLET, COATED ORAL DAILY
Qty: 90 TABLET | Refills: 3 | Status: SHIPPED | OUTPATIENT
Start: 2025-08-19

## 2026-07-27 ENCOUNTER — APPOINTMENT (OUTPATIENT)
Dept: CARDIOLOGY | Facility: CLINIC | Age: 86
End: 2026-07-27
Payer: MEDICARE